# Patient Record
Sex: MALE | Employment: OTHER
[De-identification: names, ages, dates, MRNs, and addresses within clinical notes are randomized per-mention and may not be internally consistent; named-entity substitution may affect disease eponyms.]

---

## 2017-01-19 ENCOUNTER — CHARTING TRANS (OUTPATIENT)
Dept: OTHER | Age: 67
End: 2017-01-19

## 2017-01-25 ENCOUNTER — LAB SERVICES (OUTPATIENT)
Dept: OTHER | Age: 67
End: 2017-01-25

## 2017-01-25 LAB
ALBUMIN SERPL BCG-MCNC: 4.2 G/DL (ref 3.6–5.1)
ALBUMIN/CREAT UR: 11 MG/G (ref 0–30)
ALP SERPL-CCNC: 82 U/L (ref 30–130)
ALT SERPL W/O P-5'-P-CCNC: 94 U/L (ref 17–47)
AST SERPL-CCNC: 36 U/L (ref 14–43)
BILIRUB SERPL-MCNC: 1 MG/DL (ref 0–1.3)
BUN SERPL-MCNC: 18 MG/DL (ref 6–27)
CALCIUM SERPL-MCNC: 9.3 MG/DL (ref 8.6–10.6)
CHLORIDE SERPL-SCNC: 103 MMOL/L (ref 96–107)
CHOLEST SERPL-MCNC: 193 MG/DL (ref 140–200)
CREAT UR-MCNC: 222 MG/DL
CREATININE, SERUM: 1 MG/DL (ref 0.6–1.6)
DIFFERENTIAL TYPE: NORMAL
GFR SERPL CREATININE-BSD FRML MDRD: >60 ML/MIN/{1.73M2}
GFR SERPL CREATININE-BSD FRML MDRD: >60 ML/MIN/{1.73M2}
GLUCOSE P FAST SERPL-MCNC: 92 MG/DL (ref 60–100)
HCO3 SERPL-SCNC: 26 MMOL/L (ref 22–32)
HDLC SERPL-MCNC: 34 MG/DL
HEMATOCRIT: 46.1 % (ref 40–51)
HEMOGLOBIN: 15.8 G/DL (ref 13.7–17.5)
LDLC SERPL CALC-MCNC: 121 MG/DL (ref 30–100)
LYMPH PERCENT: 29.7 % (ref 20.5–51.1)
LYMPHOCYTE ABSOLUTE #: 1.2 10*3/UL (ref 1.2–3.4)
MEAN CORPUSCULAR HGB CONCENTRATION: 34.3 % (ref 32–36)
MEAN CORPUSCULAR HGB: 30.5 PG (ref 27–34)
MEAN CORPUSCULAR VOLUME: 89 FL (ref 79–95)
MEAN PLATELET VOLUME: 9.6 FL (ref 8.6–12.4)
MICROALBUMIN UR-MCNC: 24.4 MG/L
MIXED %: 9.4 % (ref 4.3–12.9)
MIXED ABSOLUTE #: 0.4 10*3/UL (ref 0.2–0.9)
NEUTROPHIL ABSOLUTE #: 2.4 10*3/UL (ref 1.4–6.5)
NEUTROPHIL PERCENT: 60.9 % (ref 34–73.5)
PLATELET COUNT: 221 10*3/UL (ref 150–400)
POTASSIUM SERPL-SCNC: 4.6 MMOL/L (ref 3.5–5.3)
PROT SERPL-MCNC: 7.2 G/DL (ref 6.4–8.5)
PSA SERPL-MCNC: 1.39 NG/ML (ref 0–4.4)
RED BLOOD CELL COUNT: 5.18 10*6/UL (ref 3.9–5.7)
RED CELL DISTRIBUTION WIDTH: 13 % (ref 11.3–14.8)
SODIUM SERPL-SCNC: 142 MMOL/L (ref 136–146)
TRIGL SERPL-MCNC: 190 MG/DL (ref 0–200)
WHITE BLOOD CELL COUNT: 4 10*3/UL (ref 4–10)

## 2017-01-28 ENCOUNTER — CHARTING TRANS (OUTPATIENT)
Dept: FAMILY MEDICINE | Age: 67
End: 2017-01-28

## 2017-02-10 ENCOUNTER — CHARTING TRANS (OUTPATIENT)
Dept: OTHER | Age: 67
End: 2017-02-10

## 2017-02-10 ENCOUNTER — LAB SERVICES (OUTPATIENT)
Dept: OTHER | Age: 67
End: 2017-02-10

## 2017-02-10 ENCOUNTER — CHARTING TRANS (OUTPATIENT)
Dept: URGENT CARE | Age: 67
End: 2017-02-10

## 2017-02-10 ENCOUNTER — MYAURORA ACCOUNT LINK (OUTPATIENT)
Dept: OTHER | Age: 67
End: 2017-02-10

## 2017-02-10 ASSESSMENT — PAIN SCALES - GENERAL: PAINLEVEL_OUTOF10: 7

## 2017-02-13 LAB — CULTURE WOUND WITH GRAM: ABNORMAL

## 2017-02-15 ENCOUNTER — CHARTING TRANS (OUTPATIENT)
Dept: OTHER | Age: 67
End: 2017-02-15

## 2017-02-16 ENCOUNTER — CHARTING TRANS (OUTPATIENT)
Dept: OTHER | Age: 67
End: 2017-02-16

## 2017-02-16 LAB — CULTURE ANAEROBIC: NORMAL

## 2017-02-17 ENCOUNTER — CHARTING TRANS (OUTPATIENT)
Dept: OTHER | Age: 67
End: 2017-02-17

## 2017-02-20 ENCOUNTER — CHARTING TRANS (OUTPATIENT)
Dept: FAMILY MEDICINE | Age: 67
End: 2017-02-20

## 2017-02-22 ENCOUNTER — CHARTING TRANS (OUTPATIENT)
Dept: OTHER | Age: 67
End: 2017-02-22

## 2017-02-23 ENCOUNTER — CHARTING TRANS (OUTPATIENT)
Dept: OTHER | Age: 67
End: 2017-02-23

## 2017-02-24 ENCOUNTER — CHARTING TRANS (OUTPATIENT)
Dept: OTHER | Age: 67
End: 2017-02-24

## 2017-02-25 ENCOUNTER — LAB SERVICES (OUTPATIENT)
Dept: OTHER | Age: 67
End: 2017-02-25

## 2017-02-27 ENCOUNTER — CHARTING TRANS (OUTPATIENT)
Dept: OTHER | Age: 67
End: 2017-02-27

## 2017-02-27 LAB — C DIFF TOX A+B STL QL IA: POSITIVE

## 2017-03-01 ENCOUNTER — CHARTING TRANS (OUTPATIENT)
Dept: OTHER | Age: 67
End: 2017-03-01

## 2017-03-07 ENCOUNTER — MYAURORA ACCOUNT LINK (OUTPATIENT)
Dept: OTHER | Age: 67
End: 2017-03-07

## 2017-03-07 ENCOUNTER — CHARTING TRANS (OUTPATIENT)
Dept: INFECTIOUS DISEASES | Age: 67
End: 2017-03-07

## 2017-03-09 ENCOUNTER — CHARTING TRANS (OUTPATIENT)
Dept: OTHER | Age: 67
End: 2017-03-09

## 2017-03-12 LAB — CULTURE FUNGAL WITH STAIN: NORMAL

## 2017-03-14 ENCOUNTER — CHARTING TRANS (OUTPATIENT)
Dept: OTHER | Age: 67
End: 2017-03-14

## 2017-03-15 ENCOUNTER — CHARTING TRANS (OUTPATIENT)
Dept: OTHER | Age: 67
End: 2017-03-15

## 2017-03-21 ENCOUNTER — CHARTING TRANS (OUTPATIENT)
Dept: OTHER | Age: 67
End: 2017-03-21

## 2017-03-23 ENCOUNTER — CHARTING TRANS (OUTPATIENT)
Dept: SURGERY | Age: 67
End: 2017-03-23

## 2017-03-24 ENCOUNTER — CHARTING TRANS (OUTPATIENT)
Dept: OTHER | Age: 67
End: 2017-03-24

## 2017-03-30 ENCOUNTER — CHARTING TRANS (OUTPATIENT)
Dept: OTHER | Age: 67
End: 2017-03-30

## 2017-04-03 ENCOUNTER — CHARTING TRANS (OUTPATIENT)
Dept: FAMILY MEDICINE | Age: 67
End: 2017-04-03

## 2017-04-08 LAB — CULTURE AFB WITH STAIN: NORMAL

## 2017-04-14 ENCOUNTER — CHARTING TRANS (OUTPATIENT)
Dept: OTHER | Age: 67
End: 2017-04-14

## 2017-04-24 ENCOUNTER — CHARTING TRANS (OUTPATIENT)
Dept: SURGERY | Age: 67
End: 2017-04-24

## 2017-04-24 ENCOUNTER — MYAURORA ACCOUNT LINK (OUTPATIENT)
Dept: OTHER | Age: 67
End: 2017-04-24

## 2017-08-24 ENCOUNTER — CHARTING TRANS (OUTPATIENT)
Dept: OTHER | Age: 67
End: 2017-08-24

## 2017-09-16 ENCOUNTER — CHARTING TRANS (OUTPATIENT)
Dept: OTHER | Age: 67
End: 2017-09-16

## 2017-09-18 ENCOUNTER — CHARTING TRANS (OUTPATIENT)
Dept: OTHER | Age: 67
End: 2017-09-18

## 2017-09-19 ENCOUNTER — CHARTING TRANS (OUTPATIENT)
Dept: OTHER | Age: 67
End: 2017-09-19

## 2017-09-22 ENCOUNTER — CHARTING TRANS (OUTPATIENT)
Dept: FAMILY MEDICINE | Age: 67
End: 2017-09-22

## 2017-09-27 ENCOUNTER — CHARTING TRANS (OUTPATIENT)
Dept: OTHER | Age: 67
End: 2017-09-27

## 2017-09-27 ENCOUNTER — MYAURORA ACCOUNT LINK (OUTPATIENT)
Dept: OTHER | Age: 67
End: 2017-09-27

## 2017-09-27 ENCOUNTER — CHARTING TRANS (OUTPATIENT)
Dept: OTOLARYNGOLOGY | Age: 67
End: 2017-09-27

## 2017-10-11 ENCOUNTER — MYAURORA ACCOUNT LINK (OUTPATIENT)
Dept: OTHER | Age: 67
End: 2017-10-11

## 2017-10-11 ENCOUNTER — CHARTING TRANS (OUTPATIENT)
Dept: OTHER | Age: 67
End: 2017-10-11

## 2017-10-11 ENCOUNTER — CHARTING TRANS (OUTPATIENT)
Dept: INTERNAL MEDICINE | Age: 67
End: 2017-10-11

## 2018-01-22 ENCOUNTER — LAB SERVICES (OUTPATIENT)
Dept: OTHER | Age: 68
End: 2018-01-22

## 2018-01-22 ENCOUNTER — MYAURORA ACCOUNT LINK (OUTPATIENT)
Dept: OTHER | Age: 68
End: 2018-01-22

## 2018-01-22 LAB
ALBUMIN SERPL BCG-MCNC: 4.1 G/DL (ref 3.6–5.1)
ALP SERPL-CCNC: 85 U/L (ref 30–130)
ALT SERPL W/O P-5'-P-CCNC: 95 U/L (ref 17–47)
AST SERPL-CCNC: 31 U/L (ref 14–43)
BILIRUB SERPL-MCNC: 0.7 MG/DL (ref 0–1.3)
BUN SERPL-MCNC: 12 MG/DL (ref 6–27)
CALCIUM SERPL-MCNC: 9.3 MG/DL (ref 8.6–10.6)
CHLORIDE SERPL-SCNC: 108 MMOL/L (ref 96–107)
CHOLEST SERPL-MCNC: 126 MG/DL (ref 140–200)
CREATININE, SERUM: 0.9 MG/DL (ref 0.6–1.6)
DIFFERENTIAL TYPE: ABNORMAL
GFR SERPL CREATININE-BSD FRML MDRD: >60 ML/MIN/{1.73M2}
GFR SERPL CREATININE-BSD FRML MDRD: >60 ML/MIN/{1.73M2}
GLUCOSE P FAST SERPL-MCNC: 90 MG/DL (ref 60–100)
HCO3 SERPL-SCNC: 24 MMOL/L (ref 22–32)
HDLC SERPL-MCNC: 39 MG/DL
HEMATOCRIT: 45 % (ref 40–51)
HEMOGLOBIN: 15.7 G/DL (ref 13.7–17.5)
LDLC SERPL CALC-MCNC: 59 MG/DL (ref 30–100)
LYMPH PERCENT: 32.3 % (ref 20.5–51.1)
LYMPHOCYTE ABSOLUTE #: 1.2 10*3/UL (ref 1.2–3.4)
MEAN CORPUSCULAR HGB CONCENTRATION: 34.9 % (ref 32–36)
MEAN CORPUSCULAR HGB: 31.4 PG (ref 27–34)
MEAN CORPUSCULAR VOLUME: 90 FL (ref 79–95)
MEAN PLATELET VOLUME: 9.5 FL (ref 8.6–12.4)
MIXED %: 7.4 % (ref 4.3–12.9)
MIXED ABSOLUTE #: 0.3 10*3/UL (ref 0.2–0.9)
NEUTROPHIL ABSOLUTE #: 2.1 10*3/UL (ref 1.4–6.5)
NEUTROPHIL PERCENT: 60.3 % (ref 34–73.5)
PLATELET COUNT: 233 10*3/UL (ref 150–400)
POTASSIUM SERPL-SCNC: 4.7 MMOL/L (ref 3.5–5.3)
PROT SERPL-MCNC: 7.1 G/DL (ref 6.4–8.5)
PSA SERPL-MCNC: 1.19 NG/ML (ref 0–4.6)
RED BLOOD CELL COUNT: 5 10*6/UL (ref 3.9–5.7)
RED CELL DISTRIBUTION WIDTH: 13.5 % (ref 11.3–14.8)
SODIUM SERPL-SCNC: 143 MMOL/L (ref 136–146)
TRIGL SERPL-MCNC: 138 MG/DL (ref 0–200)
WHITE BLOOD CELL COUNT: 3.6 10*3/UL (ref 4–10)

## 2018-03-01 ENCOUNTER — CHARTING TRANS (OUTPATIENT)
Dept: OTHER | Age: 68
End: 2018-03-01

## 2018-07-23 ENCOUNTER — CHARTING TRANS (OUTPATIENT)
Dept: OTHER | Age: 68
End: 2018-07-23

## 2018-08-23 ENCOUNTER — CHARTING TRANS (OUTPATIENT)
Dept: OTHER | Age: 68
End: 2018-08-23

## 2018-09-13 ENCOUNTER — CHARTING TRANS (OUTPATIENT)
Dept: OTHER | Age: 68
End: 2018-09-13

## 2018-11-08 ENCOUNTER — CHARTING TRANS (OUTPATIENT)
Dept: OTHER | Age: 68
End: 2018-11-08

## 2018-11-23 ENCOUNTER — IMAGING SERVICES (OUTPATIENT)
Dept: OTHER | Age: 68
End: 2018-11-23

## 2018-11-28 VITALS
BODY MASS INDEX: 27.35 KG/M2 | SYSTOLIC BLOOD PRESSURE: 130 MMHG | DIASTOLIC BLOOD PRESSURE: 80 MMHG | RESPIRATION RATE: 16 BRPM | TEMPERATURE: 97.3 F | HEIGHT: 70 IN | WEIGHT: 191 LBS | HEART RATE: 77 BPM

## 2018-11-28 VITALS
SYSTOLIC BLOOD PRESSURE: 140 MMHG | RESPIRATION RATE: 18 BRPM | TEMPERATURE: 97.5 F | HEART RATE: 70 BPM | WEIGHT: 192 LBS | DIASTOLIC BLOOD PRESSURE: 80 MMHG | HEIGHT: 70 IN | BODY MASS INDEX: 27.49 KG/M2

## 2018-11-28 VITALS — TEMPERATURE: 97 F | HEIGHT: 70 IN | BODY MASS INDEX: 26.77 KG/M2 | WEIGHT: 187 LBS

## 2018-11-28 VITALS — WEIGHT: 185 LBS | BODY MASS INDEX: 26.48 KG/M2 | HEIGHT: 70 IN | TEMPERATURE: 97.4 F

## 2018-11-28 VITALS
HEIGHT: 69 IN | WEIGHT: 189 LBS | BODY MASS INDEX: 27.99 KG/M2 | TEMPERATURE: 97.6 F | RESPIRATION RATE: 18 BRPM | SYSTOLIC BLOOD PRESSURE: 112 MMHG | DIASTOLIC BLOOD PRESSURE: 70 MMHG | HEART RATE: 88 BPM

## 2018-11-28 VITALS — BODY MASS INDEX: 27.49 KG/M2 | WEIGHT: 192 LBS | HEIGHT: 70 IN

## 2018-11-29 VITALS
WEIGHT: 184 LBS | SYSTOLIC BLOOD PRESSURE: 102 MMHG | BODY MASS INDEX: 27.25 KG/M2 | TEMPERATURE: 99.1 F | DIASTOLIC BLOOD PRESSURE: 68 MMHG | HEIGHT: 69 IN | RESPIRATION RATE: 16 BRPM | HEART RATE: 84 BPM

## 2018-11-29 VITALS
BODY MASS INDEX: 27.4 KG/M2 | SYSTOLIC BLOOD PRESSURE: 122 MMHG | HEART RATE: 87 BPM | WEIGHT: 185 LBS | HEIGHT: 69 IN | DIASTOLIC BLOOD PRESSURE: 78 MMHG | TEMPERATURE: 97.7 F

## 2018-11-29 VITALS
HEART RATE: 80 BPM | HEIGHT: 70 IN | RESPIRATION RATE: 16 BRPM | DIASTOLIC BLOOD PRESSURE: 88 MMHG | SYSTOLIC BLOOD PRESSURE: 138 MMHG | BODY MASS INDEX: 28.35 KG/M2 | TEMPERATURE: 98 F | WEIGHT: 198 LBS

## 2018-11-29 VITALS
TEMPERATURE: 97.7 F | SYSTOLIC BLOOD PRESSURE: 122 MMHG | DIASTOLIC BLOOD PRESSURE: 84 MMHG | OXYGEN SATURATION: 97 % | HEART RATE: 103 BPM | RESPIRATION RATE: 16 BRPM

## 2018-12-17 DIAGNOSIS — E78.5 HYPERLIPIDEMIA, UNSPECIFIED HYPERLIPIDEMIA TYPE: Primary | ICD-10-CM

## 2018-12-17 DIAGNOSIS — I10 ESSENTIAL HYPERTENSION, BENIGN: ICD-10-CM

## 2018-12-17 DIAGNOSIS — I10 BENIGN ESSENTIAL HYPERTENSION: ICD-10-CM

## 2018-12-17 DIAGNOSIS — K76.0 FATTY LIVER: ICD-10-CM

## 2018-12-17 DIAGNOSIS — Z12.5 SCREENING FOR PROSTATE CANCER: ICD-10-CM

## 2019-01-07 ENCOUNTER — OFFICE VISIT (OUTPATIENT)
Dept: FAMILY MEDICINE | Age: 69
End: 2019-01-07

## 2019-01-07 VITALS
BODY MASS INDEX: 29.74 KG/M2 | HEIGHT: 69 IN | RESPIRATION RATE: 16 BRPM | HEART RATE: 62 BPM | TEMPERATURE: 97.4 F | SYSTOLIC BLOOD PRESSURE: 120 MMHG | DIASTOLIC BLOOD PRESSURE: 76 MMHG | WEIGHT: 200.8 LBS

## 2019-01-07 DIAGNOSIS — E78.00 PURE HYPERCHOLESTEROLEMIA: ICD-10-CM

## 2019-01-07 DIAGNOSIS — Z23 NEED FOR VACCINATION AGAINST STREPTOCOCCUS PNEUMONIAE: ICD-10-CM

## 2019-01-07 DIAGNOSIS — Z11.59 NEED FOR HEPATITIS C SCREENING TEST: Primary | ICD-10-CM

## 2019-01-07 DIAGNOSIS — I10 ESSENTIAL HYPERTENSION, BENIGN: ICD-10-CM

## 2019-01-07 DIAGNOSIS — I35.1 MILD AORTIC REGURGITATION: ICD-10-CM

## 2019-01-07 DIAGNOSIS — K76.0 FATTY LIVER: ICD-10-CM

## 2019-01-07 PROCEDURE — 90670 PCV13 VACCINE IM: CPT

## 2019-01-07 PROCEDURE — 90471 IMMUNIZATION ADMIN: CPT

## 2019-01-07 PROCEDURE — 3078F DIAST BP <80 MM HG: CPT | Performed by: FAMILY MEDICINE

## 2019-01-07 PROCEDURE — 3074F SYST BP LT 130 MM HG: CPT | Performed by: FAMILY MEDICINE

## 2019-01-07 PROCEDURE — 99214 OFFICE O/P EST MOD 30 MIN: CPT | Performed by: FAMILY MEDICINE

## 2019-01-07 RX ORDER — ATORVASTATIN CALCIUM 10 MG/1
1 TABLET, FILM COATED ORAL NIGHTLY
COMMUNITY
Start: 2018-08-22 | End: 2019-07-02 | Stop reason: SDUPTHER

## 2019-01-07 RX ORDER — LISINOPRIL 10 MG/1
1 TABLET ORAL DAILY
COMMUNITY
Start: 2018-11-08 | End: 2019-03-30 | Stop reason: SDUPTHER

## 2019-01-07 SDOH — HEALTH STABILITY: MENTAL HEALTH: HOW OFTEN DO YOU HAVE A DRINK CONTAINING ALCOHOL?: NEVER

## 2019-01-07 ASSESSMENT — ENCOUNTER SYMPTOMS
NEUROLOGICAL NEGATIVE: 1
ACTIVITY CHANGE: 0
ALLERGIC/IMMUNOLOGIC NEGATIVE: 1
CHILLS: 0
CONSTITUTIONAL NEGATIVE: 1
PSYCHIATRIC NEGATIVE: 1
FATIGUE: 0
HEMATOLOGIC/LYMPHATIC NEGATIVE: 1
DIAPHORESIS: 0
ENDOCRINE NEGATIVE: 1
GASTROINTESTINAL NEGATIVE: 1
RESPIRATORY NEGATIVE: 1
EYES NEGATIVE: 1
FEVER: 0

## 2019-01-16 ENCOUNTER — LAB SERVICES (OUTPATIENT)
Dept: LAB | Age: 69
End: 2019-01-16

## 2019-01-16 ENCOUNTER — E-ADVICE (OUTPATIENT)
Dept: FAMILY MEDICINE | Age: 69
End: 2019-01-16

## 2019-01-16 DIAGNOSIS — E78.5 HYPERLIPIDEMIA, UNSPECIFIED HYPERLIPIDEMIA TYPE: ICD-10-CM

## 2019-01-16 DIAGNOSIS — Z12.5 SCREENING FOR PROSTATE CANCER: ICD-10-CM

## 2019-01-16 DIAGNOSIS — I10 BENIGN ESSENTIAL HYPERTENSION: ICD-10-CM

## 2019-01-16 DIAGNOSIS — K76.0 FATTY LIVER: ICD-10-CM

## 2019-01-16 DIAGNOSIS — I10 ESSENTIAL HYPERTENSION, BENIGN: ICD-10-CM

## 2019-01-16 DIAGNOSIS — Z11.59 NEED FOR HEPATITIS C SCREENING TEST: ICD-10-CM

## 2019-01-16 LAB
ALBUMIN SERPL-MCNC: 3.8 G/DL (ref 3.6–5.1)
ALP SERPL-CCNC: 96 U/L (ref 45–115)
ALT SERPL W/O P-5'-P-CCNC: 70 U/L (ref 5–49)
AST SERPL-CCNC: 26 U/L (ref 14–43)
BILIRUB SERPL-MCNC: 0.6 MG/DL (ref 0–1.3)
BUN SERPL-MCNC: 15 MG/DL (ref 6–27)
CALCIUM SERPL-MCNC: 8.9 MG/DL (ref 8.6–10.6)
CHLORIDE SERPL-SCNC: 106 MMOL/L (ref 96–107)
CHOLEST SERPL-MCNC: 111 MG/DL (ref 140–200)
CO2 SERPL-SCNC: 24 MMOL/L (ref 22–32)
CREAT SERPL-MCNC: 0.9 MG/DL (ref 0.6–1.6)
DIFFERENTIAL TYPE: NORMAL
GFR SERPL CREATININE-BSD FRML MDRD: >60 ML/MIN/{1.73M2}
GFR SERPL CREATININE-BSD FRML MDRD: >60 ML/MIN/{1.73M2}
GLUCOSE P FAST SERPL-MCNC: 99 MG/DL (ref 60–100)
HDLC SERPL-MCNC: 34 MG/DL
HEMATOCRIT: 45.6 % (ref 40–51)
HEMOGLOBIN: 15.6 G/DL (ref 13.7–17.5)
LDLC SERPL CALC-MCNC: 63 MG/DL (ref 30–100)
LYMPH PERCENT: 26 % (ref 20.5–51.1)
LYMPHOCYTE ABSOLUTE #: 1.4 10*3/UL (ref 1.2–3.4)
MEAN CORPUSCULAR HGB CONCENTRATION: 34.2 % (ref 32–36)
MEAN CORPUSCULAR HGB: 31 PG (ref 27–34)
MEAN CORPUSCULAR VOLUME: 90.7 FL (ref 79–95)
MEAN PLATELET VOLUME: 10 FL (ref 8.6–12.4)
MIXED %: 9.8 % (ref 4.3–12.9)
MIXED ABSOLUTE #: 0.5 10*3/UL (ref 0.2–0.9)
NEUTROPHIL ABSOLUTE #: 3.6 10*3/UL (ref 1.4–6.5)
NEUTROPHIL PERCENT: 64.2 % (ref 34–73.5)
PLATELET COUNT: 262 10*3/UL (ref 150–400)
POTASSIUM SERPL-SCNC: 4.4 MMOL/L (ref 3.5–5.3)
PROT SERPL-MCNC: 7.1 G/DL (ref 6.4–8.5)
PSA SERPL-MCNC: 1.2 NG/ML (ref 0–4.6)
RED BLOOD CELL COUNT: 5.03 10*6/UL (ref 3.9–5.7)
RED CELL DISTRIBUTION WIDTH: 13.4 % (ref 11.3–14.8)
SODIUM SERPL-SCNC: 139 MMOL/L (ref 136–146)
TRIGL SERPL-MCNC: 72 MG/DL (ref 0–200)
WHITE BLOOD CELL COUNT: 5.5 10*3/UL (ref 4–10)

## 2019-01-16 PROCEDURE — 85025 COMPLETE CBC W/AUTO DIFF WBC: CPT | Performed by: FAMILY MEDICINE

## 2019-01-16 PROCEDURE — 86803 HEPATITIS C AB TEST: CPT | Performed by: FAMILY MEDICINE

## 2019-01-16 PROCEDURE — 36415 COLL VENOUS BLD VENIPUNCTURE: CPT | Performed by: FAMILY MEDICINE

## 2019-01-16 PROCEDURE — 80053 COMPREHEN METABOLIC PANEL: CPT | Performed by: FAMILY MEDICINE

## 2019-01-16 PROCEDURE — G0103 PSA SCREENING: HCPCS | Performed by: FAMILY MEDICINE

## 2019-01-16 PROCEDURE — 80061 LIPID PANEL: CPT | Performed by: FAMILY MEDICINE

## 2019-01-17 LAB — HCV AB SER QL: NEGATIVE

## 2019-01-21 ENCOUNTER — TELEPHONE (OUTPATIENT)
Dept: FAMILY MEDICINE | Age: 69
End: 2019-01-21

## 2019-01-21 ENCOUNTER — OFFICE VISIT (OUTPATIENT)
Dept: FAMILY MEDICINE | Age: 69
End: 2019-01-21

## 2019-01-21 VITALS
BODY MASS INDEX: 29.83 KG/M2 | WEIGHT: 202 LBS | SYSTOLIC BLOOD PRESSURE: 118 MMHG | TEMPERATURE: 96.7 F | DIASTOLIC BLOOD PRESSURE: 78 MMHG | OXYGEN SATURATION: 97 % | HEART RATE: 90 BPM | RESPIRATION RATE: 20 BRPM

## 2019-01-21 DIAGNOSIS — J40 BRONCHITIS: Primary | ICD-10-CM

## 2019-01-21 PROCEDURE — 3078F DIAST BP <80 MM HG: CPT | Performed by: FAMILY MEDICINE

## 2019-01-21 PROCEDURE — 3074F SYST BP LT 130 MM HG: CPT | Performed by: FAMILY MEDICINE

## 2019-01-21 PROCEDURE — 99214 OFFICE O/P EST MOD 30 MIN: CPT | Performed by: FAMILY MEDICINE

## 2019-01-21 RX ORDER — AZITHROMYCIN 250 MG/1
TABLET, FILM COATED ORAL
Qty: 6 TABLET | Refills: 0 | Status: SHIPPED | OUTPATIENT
Start: 2019-01-21 | End: 2019-06-13 | Stop reason: ALTCHOICE

## 2019-01-21 ASSESSMENT — ENCOUNTER SYMPTOMS
CHILLS: 0
SHORTNESS OF BREATH: 0
ABDOMINAL PAIN: 0
DIZZINESS: 0
FEVER: 0
COUGH: 1
SORE THROAT: 1

## 2019-01-24 ENCOUNTER — IMAGING SERVICES (OUTPATIENT)
Dept: OTHER | Age: 69
End: 2019-01-24

## 2019-04-09 RX ORDER — LISINOPRIL 10 MG/1
TABLET ORAL
Qty: 30 TABLET | Refills: 0 | Status: SHIPPED | OUTPATIENT
Start: 2019-04-09 | End: 2019-05-01 | Stop reason: SDUPTHER

## 2019-05-02 RX ORDER — LISINOPRIL 10 MG/1
TABLET ORAL
Qty: 30 TABLET | Refills: 0 | Status: SHIPPED | OUTPATIENT
Start: 2019-05-02 | End: 2019-05-07 | Stop reason: SDUPTHER

## 2019-05-07 RX ORDER — LISINOPRIL 10 MG/1
10 TABLET ORAL DAILY
Qty: 30 TABLET | Refills: 5 | Status: SHIPPED | OUTPATIENT
Start: 2019-05-07 | End: 2019-12-19 | Stop reason: SDUPTHER

## 2019-06-13 ENCOUNTER — OFFICE VISIT (OUTPATIENT)
Dept: INTERNAL MEDICINE | Age: 69
End: 2019-06-13

## 2019-06-13 DIAGNOSIS — L02.31 ABSCESS, GLUTEAL, RIGHT: Primary | ICD-10-CM

## 2019-06-13 PROCEDURE — 99214 OFFICE O/P EST MOD 30 MIN: CPT | Performed by: PHYSICIAN ASSISTANT

## 2019-06-13 PROCEDURE — 3078F DIAST BP <80 MM HG: CPT | Performed by: PHYSICIAN ASSISTANT

## 2019-06-13 PROCEDURE — 3074F SYST BP LT 130 MM HG: CPT | Performed by: PHYSICIAN ASSISTANT

## 2019-06-13 RX ORDER — SULFAMETHOXAZOLE AND TRIMETHOPRIM 800; 160 MG/1; MG/1
1 TABLET ORAL 2 TIMES DAILY
Qty: 14 TABLET | Refills: 0 | Status: SHIPPED | OUTPATIENT
Start: 2019-06-13 | End: 2019-12-30 | Stop reason: ALTCHOICE

## 2019-06-13 SDOH — HEALTH STABILITY: PHYSICAL HEALTH: ON AVERAGE, HOW MANY MINUTES DO YOU ENGAGE IN EXERCISE AT THIS LEVEL?: 0 MIN

## 2019-06-13 SDOH — HEALTH STABILITY: MENTAL HEALTH
STRESS IS WHEN SOMEONE FEELS TENSE, NERVOUS, ANXIOUS, OR CAN'T SLEEP AT NIGHT BECAUSE THEIR MIND IS TROUBLED. HOW STRESSED ARE YOU?: NOT AT ALL

## 2019-06-13 SDOH — HEALTH STABILITY: PHYSICAL HEALTH: ON AVERAGE, HOW MANY DAYS PER WEEK DO YOU ENGAGE IN MODERATE TO STRENUOUS EXERCISE (LIKE A BRISK WALK)?: 0 DAYS

## 2019-06-13 SDOH — HEALTH STABILITY: MENTAL HEALTH: HOW OFTEN DO YOU HAVE A DRINK CONTAINING ALCOHOL?: NEVER

## 2019-06-13 ASSESSMENT — PATIENT HEALTH QUESTIONNAIRE - PHQ9
SUM OF ALL RESPONSES TO PHQ9 QUESTIONS 1 AND 2: 0
1. LITTLE INTEREST OR PLEASURE IN DOING THINGS: NOT AT ALL
SUM OF ALL RESPONSES TO PHQ9 QUESTIONS 1 AND 2: 0
2. FEELING DOWN, DEPRESSED OR HOPELESS: NOT AT ALL

## 2019-06-13 ASSESSMENT — PAIN SCALES - GENERAL: PAINLEVEL: 3-4

## 2019-07-02 RX ORDER — ATORVASTATIN CALCIUM 10 MG/1
10 TABLET, FILM COATED ORAL NIGHTLY
Qty: 90 TABLET | Refills: 0 | Status: SHIPPED | OUTPATIENT
Start: 2019-07-02 | End: 2019-12-20 | Stop reason: SDUPTHER

## 2019-12-19 RX ORDER — LISINOPRIL 10 MG/1
10 TABLET ORAL DAILY
Qty: 30 TABLET | Refills: 0 | Status: SHIPPED | OUTPATIENT
Start: 2019-12-19 | End: 2019-12-30 | Stop reason: DRUGHIGH

## 2019-12-23 RX ORDER — ATORVASTATIN CALCIUM 10 MG/1
10 TABLET, FILM COATED ORAL NIGHTLY
Qty: 30 TABLET | Refills: 0 | OUTPATIENT
Start: 2019-12-23

## 2019-12-23 RX ORDER — LISINOPRIL 10 MG/1
TABLET ORAL
Qty: 30 TABLET | Refills: 4 | OUTPATIENT
Start: 2019-12-23

## 2019-12-23 RX ORDER — ATORVASTATIN CALCIUM 10 MG/1
10 TABLET, FILM COATED ORAL NIGHTLY
Qty: 90 TABLET | Refills: 0 | Status: SHIPPED | OUTPATIENT
Start: 2019-12-23 | End: 2019-12-30 | Stop reason: SDUPTHER

## 2019-12-30 ENCOUNTER — OFFICE VISIT (OUTPATIENT)
Dept: FAMILY MEDICINE | Age: 69
End: 2019-12-30

## 2019-12-30 VITALS
BODY MASS INDEX: 28.88 KG/M2 | HEART RATE: 80 BPM | WEIGHT: 195 LBS | SYSTOLIC BLOOD PRESSURE: 110 MMHG | DIASTOLIC BLOOD PRESSURE: 82 MMHG | HEIGHT: 69 IN

## 2019-12-30 DIAGNOSIS — H61.22 IMPACTED CERUMEN OF LEFT EAR: ICD-10-CM

## 2019-12-30 DIAGNOSIS — E78.5 HYPERLIPIDEMIA, UNSPECIFIED HYPERLIPIDEMIA TYPE: ICD-10-CM

## 2019-12-30 DIAGNOSIS — I10 ESSENTIAL HYPERTENSION: ICD-10-CM

## 2019-12-30 DIAGNOSIS — Z23 NEED FOR TDAP VACCINATION: ICD-10-CM

## 2019-12-30 DIAGNOSIS — Z79.899 ENCOUNTER FOR LONG-TERM (CURRENT) USE OF HIGH-RISK MEDICATION: ICD-10-CM

## 2019-12-30 DIAGNOSIS — Z00.00 MEDICARE ANNUAL WELLNESS VISIT, SUBSEQUENT: Primary | ICD-10-CM

## 2019-12-30 PROCEDURE — G0439 PPPS, SUBSEQ VISIT: HCPCS | Performed by: FAMILY MEDICINE

## 2019-12-30 PROCEDURE — 90471 IMMUNIZATION ADMIN: CPT

## 2019-12-30 PROCEDURE — 3074F SYST BP LT 130 MM HG: CPT | Performed by: FAMILY MEDICINE

## 2019-12-30 PROCEDURE — 90715 TDAP VACCINE 7 YRS/> IM: CPT

## 2019-12-30 PROCEDURE — 3079F DIAST BP 80-89 MM HG: CPT | Performed by: FAMILY MEDICINE

## 2019-12-30 PROCEDURE — 99214 OFFICE O/P EST MOD 30 MIN: CPT | Performed by: FAMILY MEDICINE

## 2019-12-30 RX ORDER — ATORVASTATIN CALCIUM 10 MG/1
10 TABLET, FILM COATED ORAL NIGHTLY
Qty: 90 TABLET | Refills: 2 | Status: SHIPPED | OUTPATIENT
Start: 2019-12-30 | End: 2020-05-04 | Stop reason: SDUPTHER

## 2019-12-30 RX ORDER — LISINOPRIL 10 MG/1
5 TABLET ORAL DAILY
Qty: 30 TABLET | Refills: 2 | Status: SHIPPED | OUTPATIENT
Start: 2019-12-30 | End: 2020-03-16 | Stop reason: SDUPTHER

## 2019-12-30 SDOH — HEALTH STABILITY: MENTAL HEALTH: HOW OFTEN DO YOU HAVE A DRINK CONTAINING ALCOHOL?: NEVER

## 2019-12-30 ASSESSMENT — ENCOUNTER SYMPTOMS
ABDOMINAL PAIN: 0
BLOOD IN STOOL: 0
EYE DISCHARGE: 0
CHILLS: 0
CONSTIPATION: 0
EYE REDNESS: 0
HEADACHES: 0
LIGHT-HEADEDNESS: 0
SHORTNESS OF BREATH: 0
ABDOMINAL DISTENTION: 0
NUMBNESS: 0
SLEEP DISTURBANCE: 0
POLYDIPSIA: 0
BACK PAIN: 0
WHEEZING: 0
ACTIVITY CHANGE: 0
UNEXPECTED WEIGHT CHANGE: 0
COUGH: 0
DIARRHEA: 0
CHEST TIGHTNESS: 0
NAUSEA: 0
EYE ITCHING: 0
WEAKNESS: 0
NERVOUS/ANXIOUS: 0
FATIGUE: 0
FEVER: 0
BRUISES/BLEEDS EASILY: 0
COLOR CHANGE: 0
SORE THROAT: 0
APPETITE CHANGE: 0
DIZZINESS: 0

## 2020-01-06 ENCOUNTER — LAB SERVICES (OUTPATIENT)
Dept: LAB | Age: 70
End: 2020-01-06

## 2020-01-06 DIAGNOSIS — Z79.899 ENCOUNTER FOR LONG-TERM (CURRENT) USE OF HIGH-RISK MEDICATION: ICD-10-CM

## 2020-01-06 LAB
ALBUMIN SERPL-MCNC: 4.3 G/DL (ref 3.6–5.1)
ALP SERPL-CCNC: 91 U/L (ref 45–115)
ALT SERPL W/O P-5'-P-CCNC: 54 U/L (ref 5–49)
AST SERPL-CCNC: 23 U/L (ref 14–43)
BASOPHIL %: 0.4 % (ref 0–1.2)
BASOPHIL ABSOLUTE #: 0 10*3/UL (ref 0–0.1)
BILIRUB SERPL-MCNC: 0.8 MG/DL (ref 0–1.3)
BUN SERPL-MCNC: 16 MG/DL (ref 6–27)
CALCIUM SERPL-MCNC: 9.4 MG/DL (ref 8.6–10.6)
CHLORIDE SERPL-SCNC: 107 MMOL/L (ref 96–107)
CHOLEST SERPL-MCNC: 130 MG/DL (ref 140–200)
CO2 SERPL-SCNC: 28 MMOL/L (ref 22–32)
CREAT SERPL-MCNC: 1 MG/DL (ref 0.6–1.6)
DIFFERENTIAL TYPE: NORMAL
EOSINOPHIL %: 5.6 % (ref 0–10)
EOSINOPHIL ABSOLUTE #: 0.3 10*3/UL (ref 0–0.5)
EST. AVERAGE GLUCOSE BLD GHB EST-MCNC: 125 MG/DL (ref 0–154)
GFR SERPL CREATININE-BSD FRML MDRD: >60 ML/MIN/{1.73M2}
GFR SERPL CREATININE-BSD FRML MDRD: >60 ML/MIN/{1.73M2}
GLUCOSE P FAST SERPL-MCNC: 100 MG/DL (ref 60–100)
HBA1C MFR BLD: 6 % (ref 4.2–6)
HDLC SERPL-MCNC: 41 MG/DL
HEMATOCRIT: 46 % (ref 40–51)
HEMOGLOBIN: 15.2 G/DL (ref 13.7–17.5)
LDLC SERPL CALC-MCNC: 68 MG/DL (ref 30–100)
LYMPH PERCENT: 30.4 % (ref 20.5–51.1)
LYMPHOCYTE ABSOLUTE #: 1.4 10*3/UL (ref 1.2–3.4)
MEAN CORPUSCULAR HGB CONCENTRATION: 33 % (ref 32–36)
MEAN CORPUSCULAR HGB: 30.2 PG (ref 27–34)
MEAN CORPUSCULAR VOLUME: 91.3 FL (ref 79–95)
MEAN PLATELET VOLUME: 10.4 FL (ref 8.6–12.4)
MONOCYTE ABSOLUTE #: 0.3 10*3/UL (ref 0.2–0.9)
MONOCYTE PERCENT: 6.4 % (ref 4.3–12.9)
NEUTROPHIL ABSOLUTE #: 2.6 10*3/UL (ref 1.4–6.5)
NEUTROPHIL PERCENT: 57.2 % (ref 34–73.5)
PLATELET COUNT: 266 10*3/UL (ref 150–400)
POTASSIUM SERPL-SCNC: 5.2 MMOL/L (ref 3.5–5.3)
PROT SERPL-MCNC: 7.6 G/DL (ref 6.4–8.5)
RED BLOOD CELL COUNT: 5.04 10*6/UL (ref 3.9–5.7)
RED CELL DISTRIBUTION WIDTH: 13.5 % (ref 11.3–14.8)
SODIUM SERPL-SCNC: 142 MMOL/L (ref 136–146)
TRIGL SERPL-MCNC: 105 MG/DL (ref 0–200)
TSH SERPL DL<=0.05 MIU/L-ACNC: 1.88 M[IU]/L (ref 0.3–4.82)
WHITE BLOOD CELL COUNT: 4.5 10*3/UL (ref 4–10)

## 2020-01-06 PROCEDURE — 80050 GENERAL HEALTH PANEL: CPT | Performed by: FAMILY MEDICINE

## 2020-01-06 PROCEDURE — 83036 HEMOGLOBIN GLYCOSYLATED A1C: CPT | Performed by: FAMILY MEDICINE

## 2020-01-06 PROCEDURE — 80061 LIPID PANEL: CPT | Performed by: FAMILY MEDICINE

## 2020-01-06 PROCEDURE — 36415 COLL VENOUS BLD VENIPUNCTURE: CPT | Performed by: FAMILY MEDICINE

## 2020-01-08 RX ORDER — LISINOPRIL 10 MG/1
5 TABLET ORAL DAILY
Qty: 30 TABLET | Refills: 2 | OUTPATIENT
Start: 2020-01-08

## 2020-03-17 RX ORDER — LISINOPRIL 10 MG/1
5 TABLET ORAL DAILY
Qty: 45 TABLET | Refills: 2 | Status: SHIPPED | OUTPATIENT
Start: 2020-03-17 | End: 2020-04-17 | Stop reason: SDUPTHER

## 2020-04-17 RX ORDER — LISINOPRIL 10 MG/1
5 TABLET ORAL DAILY
Qty: 45 TABLET | Refills: 0 | Status: SHIPPED | OUTPATIENT
Start: 2020-04-17 | End: 2020-07-07 | Stop reason: SDUPTHER

## 2020-05-04 RX ORDER — ATORVASTATIN CALCIUM 10 MG/1
10 TABLET, FILM COATED ORAL NIGHTLY
Qty: 90 TABLET | Refills: 2 | Status: SHIPPED | OUTPATIENT
Start: 2020-05-04 | End: 2020-10-29 | Stop reason: SDUPTHER

## 2020-05-22 ENCOUNTER — E-ADVICE (OUTPATIENT)
Dept: FAMILY MEDICINE | Age: 70
End: 2020-05-22

## 2020-06-18 ENCOUNTER — E-ADVICE (OUTPATIENT)
Dept: FAMILY MEDICINE | Age: 70
End: 2020-06-18

## 2020-06-19 ENCOUNTER — WALK IN (OUTPATIENT)
Dept: URGENT CARE | Age: 70
End: 2020-06-19

## 2020-06-19 ENCOUNTER — TELEPHONE (OUTPATIENT)
Dept: FAMILY MEDICINE | Age: 70
End: 2020-06-19

## 2020-06-19 DIAGNOSIS — M25.561 ACUTE PAIN OF RIGHT KNEE: Primary | ICD-10-CM

## 2020-06-19 PROCEDURE — A6449 LT COMPRES BAND >=3" <5"/YD: HCPCS | Performed by: FAMILY MEDICINE

## 2020-06-19 PROCEDURE — 99213 OFFICE O/P EST LOW 20 MIN: CPT | Performed by: FAMILY MEDICINE

## 2020-06-19 PROCEDURE — 3077F SYST BP >= 140 MM HG: CPT | Performed by: FAMILY MEDICINE

## 2020-06-19 PROCEDURE — 3078F DIAST BP <80 MM HG: CPT | Performed by: FAMILY MEDICINE

## 2020-06-19 RX ORDER — NAPROXEN 500 MG/1
500 TABLET ORAL 2 TIMES DAILY WITH MEALS
Qty: 30 TABLET | Refills: 0 | Status: SHIPPED | OUTPATIENT
Start: 2020-06-19 | End: 2020-07-04

## 2020-06-19 ASSESSMENT — PAIN SCALES - GENERAL: PAINLEVEL: 5-6

## 2020-07-09 RX ORDER — LISINOPRIL 10 MG/1
5 TABLET ORAL DAILY
Qty: 45 TABLET | Refills: 0 | Status: SHIPPED | OUTPATIENT
Start: 2020-07-09 | End: 2020-10-01

## 2020-07-15 RX ORDER — NAPROXEN 500 MG/1
500 TABLET ORAL 2 TIMES DAILY WITH MEALS
Qty: 30 TABLET | Refills: 0 | OUTPATIENT
Start: 2020-07-15 | End: 2020-07-30

## 2020-10-01 RX ORDER — LISINOPRIL 10 MG/1
TABLET ORAL
Qty: 45 TABLET | Refills: 0 | Status: SHIPPED | OUTPATIENT
Start: 2020-10-01 | End: 2020-12-29

## 2020-11-01 RX ORDER — ATORVASTATIN CALCIUM 10 MG/1
10 TABLET, FILM COATED ORAL NIGHTLY
Qty: 90 TABLET | Refills: 0 | Status: SHIPPED | OUTPATIENT
Start: 2020-11-01 | End: 2021-04-30

## 2020-11-21 ENCOUNTER — E-ADVICE (OUTPATIENT)
Dept: FAMILY MEDICINE | Age: 70
End: 2020-11-21

## 2020-12-29 RX ORDER — LISINOPRIL 10 MG/1
TABLET ORAL
Qty: 45 TABLET | Refills: 0 | Status: SHIPPED | OUTPATIENT
Start: 2020-12-29 | End: 2021-03-25 | Stop reason: SDUPTHER

## 2021-03-25 RX ORDER — LISINOPRIL 10 MG/1
TABLET ORAL
Qty: 45 TABLET | Refills: 0 | OUTPATIENT
Start: 2021-03-25

## 2021-03-25 RX ORDER — LISINOPRIL 10 MG/1
5 TABLET ORAL DAILY
Qty: 45 TABLET | Refills: 0 | Status: SHIPPED | OUTPATIENT
Start: 2021-03-25 | End: 2021-04-29 | Stop reason: DRUGHIGH

## 2021-04-06 ENCOUNTER — E-ADVICE (OUTPATIENT)
Dept: FAMILY MEDICINE | Age: 71
End: 2021-04-06

## 2021-04-29 ENCOUNTER — LAB SERVICES (OUTPATIENT)
Dept: LAB | Age: 71
End: 2021-04-29

## 2021-04-29 ENCOUNTER — IMAGING SERVICES (OUTPATIENT)
Dept: GENERAL RADIOLOGY | Age: 71
End: 2021-04-29
Attending: FAMILY MEDICINE

## 2021-04-29 ENCOUNTER — OFFICE VISIT (OUTPATIENT)
Dept: FAMILY MEDICINE | Age: 71
End: 2021-04-29

## 2021-04-29 VITALS
DIASTOLIC BLOOD PRESSURE: 80 MMHG | HEIGHT: 69 IN | SYSTOLIC BLOOD PRESSURE: 136 MMHG | BODY MASS INDEX: 29.62 KG/M2 | WEIGHT: 200 LBS

## 2021-04-29 DIAGNOSIS — Z23 NEED FOR PNEUMOCOCCAL VACCINATION: ICD-10-CM

## 2021-04-29 DIAGNOSIS — M25.561 CHRONIC PAIN OF RIGHT KNEE: ICD-10-CM

## 2021-04-29 DIAGNOSIS — I10 ESSENTIAL (PRIMARY) HYPERTENSION: ICD-10-CM

## 2021-04-29 DIAGNOSIS — R21 BUTTERFLY RASH: ICD-10-CM

## 2021-04-29 DIAGNOSIS — Z00.00 MEDICARE ANNUAL WELLNESS VISIT, SUBSEQUENT: Primary | ICD-10-CM

## 2021-04-29 DIAGNOSIS — Z79.899 ENCOUNTER FOR LONG-TERM (CURRENT) USE OF MEDICATIONS: ICD-10-CM

## 2021-04-29 DIAGNOSIS — G89.29 CHRONIC PAIN OF RIGHT KNEE: ICD-10-CM

## 2021-04-29 DIAGNOSIS — Z12.11 SCREENING FOR COLON CANCER: ICD-10-CM

## 2021-04-29 DIAGNOSIS — E78.2 HYPERLIPEMIA, MIXED: ICD-10-CM

## 2021-04-29 LAB
ALBUMIN SERPL-MCNC: 4.1 G/DL (ref 3.6–5.1)
ALP SERPL-CCNC: 96 U/L (ref 45–115)
ALT SERPL W/O P-5'-P-CCNC: 49 U/L (ref 5–49)
AST SERPL-CCNC: 26 U/L (ref 14–43)
BASOPHIL %: 0.8 % (ref 0–1.2)
BASOPHIL ABSOLUTE #: 0 10*3/UL (ref 0–0.1)
BILIRUB SERPL-MCNC: 1 MG/DL (ref 0–1.3)
BILIRUBIN URINE: NEGATIVE
BLOOD URINE: NEGATIVE
BUN SERPL-MCNC: 14 MG/DL (ref 6–27)
CALCIUM SERPL-MCNC: 9.4 MG/DL (ref 8.6–10.6)
CHLORIDE SERPL-SCNC: 104 MMOL/L (ref 96–107)
CHOLEST SERPL-MCNC: 142 MG/DL (ref 140–200)
CLARITY: CLEAR
CO2 SERPL-SCNC: 26 MMOL/L (ref 22–32)
COLOR: YELLOW
CREAT SERPL-MCNC: 0.8 MG/DL (ref 0.6–1.6)
CRP SERPL-MCNC: <0.5 MG/DL (ref 0–1)
DIFFERENTIAL TYPE: ABNORMAL
EOSINOPHIL %: 2.8 % (ref 0–10)
EOSINOPHIL ABSOLUTE #: 0.1 10*3/UL (ref 0–0.5)
EST. AVERAGE GLUCOSE BLD GHB EST-MCNC: 123 MG/DL (ref 0–154)
GFR SERPL CREATININE-BSD FRML MDRD: >60 ML/MIN/{1.73M2}
GFR SERPL CREATININE-BSD FRML MDRD: >60 ML/MIN/{1.73M2}
GLUCOSE QUALITATIVE U: NEGATIVE
GLUCOSE SERPL-MCNC: 93 MG/DL (ref 70–200)
HBA1C MFR BLD: 5.9 % (ref 4.2–6)
HDLC SERPL-MCNC: 39 MG/DL
HEMATOCRIT: 46.6 % (ref 40–51)
HEMOGLOBIN: 15.6 G/DL (ref 13.7–17.5)
IMMATURE GRANULOCYTE ABSOLUTE: 0.01 10*3/UL (ref 0–0.05)
IMMATURE GRANULOCYTE PERCENT: 0.2 % (ref 0–0.5)
KETONES, URINE: NEGATIVE
LDLC SERPL CALC-MCNC: 81 MG/DL (ref 30–100)
LEUKOCYTE ESTERASE URINE: NEGATIVE
LYMPH PERCENT: 22.9 % (ref 20.5–51.1)
LYMPHOCYTE ABSOLUTE #: 1.1 10*3/UL (ref 1.2–3.4)
MEAN CORPUSCULAR HGB CONCENTRATION: 33.5 % (ref 32–36)
MEAN CORPUSCULAR HGB: 30.3 PG (ref 27–34)
MEAN CORPUSCULAR VOLUME: 90.5 FL (ref 79–95)
MEAN PLATELET VOLUME: 10.6 FL (ref 8.6–12.4)
MONOCYTE ABSOLUTE #: 0.4 10*3/UL (ref 0.2–0.9)
MONOCYTE PERCENT: 7.9 % (ref 4.3–12.9)
NEUTROPHIL ABSOLUTE #: 3.1 10*3/UL (ref 1.4–6.5)
NEUTROPHIL PERCENT: 65.4 % (ref 34–73.5)
NITRITE URINE: NEGATIVE
PH URINE: 5 (ref 5–7)
PLATELET COUNT: 230 10*3/UL (ref 150–400)
POTASSIUM SERPL-SCNC: 4.5 MMOL/L (ref 3.5–5.3)
PROT SERPL-MCNC: 7.2 G/DL (ref 6.4–8.5)
RED BLOOD CELL COUNT: 5.15 10*6/UL (ref 3.9–5.7)
RED CELL DISTRIBUTION WIDTH: 13.2 % (ref 11.3–14.8)
SEDIMENTATION RATE, RBC: 15 MM/H (ref 0–10)
SODIUM SERPL-SCNC: 139 MMOL/L (ref 136–146)
SPECIFIC GRAVITY URINE: 1.02 (ref 1–1.03)
TRIGL SERPL-MCNC: 109 MG/DL (ref 0–200)
TSH SERPL DL<=0.05 MIU/L-ACNC: 1.82 M[IU]/L (ref 0.3–4.82)
URINE PROTEIN, QUAL (DIPSTICK): NEGATIVE
UROBILINOGEN URINE: 2
WHITE BLOOD CELL COUNT: 4.7 10*3/UL (ref 4–10)

## 2021-04-29 PROCEDURE — 83036 HEMOGLOBIN GLYCOSYLATED A1C: CPT | Performed by: FAMILY MEDICINE

## 2021-04-29 PROCEDURE — 36415 COLL VENOUS BLD VENIPUNCTURE: CPT | Performed by: FAMILY MEDICINE

## 2021-04-29 PROCEDURE — 3079F DIAST BP 80-89 MM HG: CPT | Performed by: FAMILY MEDICINE

## 2021-04-29 PROCEDURE — 86038 ANTINUCLEAR ANTIBODIES: CPT | Performed by: FAMILY MEDICINE

## 2021-04-29 PROCEDURE — 90471 IMMUNIZATION ADMIN: CPT

## 2021-04-29 PROCEDURE — 86200 CCP ANTIBODY: CPT | Performed by: FAMILY MEDICINE

## 2021-04-29 PROCEDURE — 81003 URINALYSIS AUTO W/O SCOPE: CPT | Performed by: FAMILY MEDICINE

## 2021-04-29 PROCEDURE — 73564 X-RAY EXAM KNEE 4 OR MORE: CPT | Performed by: RADIOLOGY

## 2021-04-29 PROCEDURE — 80050 GENERAL HEALTH PANEL: CPT | Performed by: FAMILY MEDICINE

## 2021-04-29 PROCEDURE — 85652 RBC SED RATE AUTOMATED: CPT | Performed by: FAMILY MEDICINE

## 2021-04-29 PROCEDURE — 99397 PER PM REEVAL EST PAT 65+ YR: CPT | Performed by: FAMILY MEDICINE

## 2021-04-29 PROCEDURE — 86225 DNA ANTIBODY NATIVE: CPT | Performed by: FAMILY MEDICINE

## 2021-04-29 PROCEDURE — 99214 OFFICE O/P EST MOD 30 MIN: CPT | Performed by: FAMILY MEDICINE

## 2021-04-29 PROCEDURE — 86140 C-REACTIVE PROTEIN: CPT | Performed by: FAMILY MEDICINE

## 2021-04-29 PROCEDURE — 90732 PPSV23 VACC 2 YRS+ SUBQ/IM: CPT

## 2021-04-29 PROCEDURE — 3075F SYST BP GE 130 - 139MM HG: CPT | Performed by: FAMILY MEDICINE

## 2021-04-29 PROCEDURE — 80061 LIPID PANEL: CPT | Performed by: FAMILY MEDICINE

## 2021-04-29 ASSESSMENT — ENCOUNTER SYMPTOMS
NUMBNESS: 0
HEADACHES: 0
LIGHT-HEADEDNESS: 0
POLYDIPSIA: 0
BLOOD IN STOOL: 0
FATIGUE: 0
PHOTOPHOBIA: 0
ABDOMINAL DISTENTION: 0
SHORTNESS OF BREATH: 0
UNEXPECTED WEIGHT CHANGE: 0
BRUISES/BLEEDS EASILY: 0
COLOR CHANGE: 0
WHEEZING: 0
FEVER: 0
CHILLS: 0
DIZZINESS: 0
DIARRHEA: 0
BACK PAIN: 0
NAUSEA: 0
CONSTIPATION: 0
SLEEP DISTURBANCE: 0
NERVOUS/ANXIOUS: 0
COUGH: 0
ABDOMINAL PAIN: 0
APPETITE CHANGE: 0
ACTIVITY CHANGE: 0
WEAKNESS: 0
SORE THROAT: 0
CHEST TIGHTNESS: 0

## 2021-04-29 ASSESSMENT — PATIENT HEALTH QUESTIONNAIRE - PHQ9
CLINICAL INTERPRETATION OF PHQ9 SCORE: NO FURTHER SCREENING NEEDED
SUM OF ALL RESPONSES TO PHQ9 QUESTIONS 1 AND 2: 0
CLINICAL INTERPRETATION OF PHQ2 SCORE: NO FURTHER SCREENING NEEDED
1. LITTLE INTEREST OR PLEASURE IN DOING THINGS: NOT AT ALL
2. FEELING DOWN, DEPRESSED OR HOPELESS: NOT AT ALL
SUM OF ALL RESPONSES TO PHQ9 QUESTIONS 1 AND 2: 0

## 2021-04-30 DIAGNOSIS — R89.9 ABNORMAL LABORATORY TEST RESULT: Primary | ICD-10-CM

## 2021-04-30 LAB
ANA SER QL IA: NORMAL RATIO (ref 0–0.6)
CCP IGG FLD IA-ACNC: <0.4 U/ML (ref 0–7)
DSDNA IGG SERPL IA-ACNC: NORMAL [IU]/ML (ref 0–10)

## 2021-04-30 RX ORDER — ATORVASTATIN CALCIUM 10 MG/1
TABLET, FILM COATED ORAL
Qty: 90 TABLET | Refills: 0 | Status: SHIPPED | OUTPATIENT
Start: 2021-04-30 | End: 2021-07-26

## 2021-04-30 ASSESSMENT — ENCOUNTER SYMPTOMS
BRUISES/BLEEDS EASILY: 0
WEAKNESS: 0
FEVER: 0
WHEEZING: 0
SHORTNESS OF BREATH: 0
DIARRHEA: 0
WOUND: 0
BACK PAIN: 0
CHILLS: 0
VOMITING: 0
ACTIVITY CHANGE: 0
NUMBNESS: 0
ADENOPATHY: 0
PHOTOPHOBIA: 0
NAUSEA: 0
CHEST TIGHTNESS: 0
COLOR CHANGE: 0

## 2021-05-03 ENCOUNTER — OFFICE VISIT (OUTPATIENT)
Dept: SPORTS MEDICINE | Age: 71
End: 2021-05-03
Attending: FAMILY MEDICINE

## 2021-05-03 ENCOUNTER — LAB SERVICES (OUTPATIENT)
Dept: LAB | Age: 71
End: 2021-05-03

## 2021-05-03 VITALS
HEIGHT: 69 IN | SYSTOLIC BLOOD PRESSURE: 118 MMHG | BODY MASS INDEX: 29.47 KG/M2 | DIASTOLIC BLOOD PRESSURE: 86 MMHG | WEIGHT: 199 LBS

## 2021-05-03 DIAGNOSIS — M25.461 PAIN AND SWELLING OF RIGHT KNEE: ICD-10-CM

## 2021-05-03 DIAGNOSIS — M17.11 PRIMARY LOCALIZED OSTEOARTHRITIS OF RIGHT KNEE: Primary | ICD-10-CM

## 2021-05-03 DIAGNOSIS — M25.561 PAIN AND SWELLING OF RIGHT KNEE: ICD-10-CM

## 2021-05-03 DIAGNOSIS — Z79.899 ENCOUNTER FOR LONG-TERM (CURRENT) USE OF MEDICATIONS: ICD-10-CM

## 2021-05-03 DIAGNOSIS — M25.661 DECREASED RANGE OF MOTION (ROM) OF RIGHT KNEE: ICD-10-CM

## 2021-05-03 DIAGNOSIS — Z12.11 SCREENING FOR COLON CANCER: ICD-10-CM

## 2021-05-03 LAB — HEMOCCULT STL QL: NEGATIVE

## 2021-05-03 PROCEDURE — 3074F SYST BP LT 130 MM HG: CPT | Performed by: FAMILY MEDICINE

## 2021-05-03 PROCEDURE — 3079F DIAST BP 80-89 MM HG: CPT | Performed by: FAMILY MEDICINE

## 2021-05-03 PROCEDURE — G0328 FECAL BLOOD SCRN IMMUNOASSAY: HCPCS | Performed by: FAMILY MEDICINE

## 2021-05-03 PROCEDURE — 20611 DRAIN/INJ JOINT/BURSA W/US: CPT | Performed by: FAMILY MEDICINE

## 2021-05-03 PROCEDURE — 99204 OFFICE O/P NEW MOD 45 MIN: CPT | Performed by: FAMILY MEDICINE

## 2021-05-03 RX ORDER — TRIAMCINOLONE ACETONIDE 40 MG/ML
40 INJECTION, SUSPENSION INTRA-ARTICULAR; INTRAMUSCULAR
Status: COMPLETED | OUTPATIENT
Start: 2021-05-03 | End: 2021-05-03

## 2021-05-03 RX ORDER — LIDOCAINE HYDROCHLORIDE 10 MG/ML
2 INJECTION, SOLUTION INFILTRATION; PERINEURAL
Status: COMPLETED | OUTPATIENT
Start: 2021-05-03 | End: 2021-05-03

## 2021-05-03 RX ORDER — BUPIVACAINE HYDROCHLORIDE 5 MG/ML
2 INJECTION, SOLUTION PERINEURAL
Status: COMPLETED | OUTPATIENT
Start: 2021-05-03 | End: 2021-05-03

## 2021-05-03 RX ADMIN — BUPIVACAINE HYDROCHLORIDE 2 ML: 5 INJECTION, SOLUTION PERINEURAL at 10:03

## 2021-05-03 RX ADMIN — TRIAMCINOLONE ACETONIDE 40 MG: 40 INJECTION, SUSPENSION INTRA-ARTICULAR; INTRAMUSCULAR at 10:03

## 2021-05-03 RX ADMIN — LIDOCAINE HYDROCHLORIDE 2 ML: 10 INJECTION, SOLUTION INFILTRATION; PERINEURAL at 10:03

## 2021-05-04 PROBLEM — M17.11 PRIMARY OSTEOARTHRITIS OF RIGHT KNEE: Status: ACTIVE | Noted: 2021-05-04

## 2021-05-04 PROBLEM — G89.29 CHRONIC PAIN OF RIGHT KNEE: Status: ACTIVE | Noted: 2021-05-04

## 2021-05-04 PROBLEM — M25.561 CHRONIC PAIN OF RIGHT KNEE: Status: ACTIVE | Noted: 2021-05-04

## 2021-05-04 PROBLEM — M25.661 LIMITATION OF JOINT MOTION OF RIGHT KNEE: Status: ACTIVE | Noted: 2021-05-04

## 2021-05-04 PROBLEM — M62.81 MUSCLE WEAKNESS: Status: ACTIVE | Noted: 2021-05-04

## 2021-05-11 ENCOUNTER — OFFICE VISIT (OUTPATIENT)
Dept: PHYSICAL THERAPY | Age: 71
End: 2021-05-11
Attending: FAMILY MEDICINE

## 2021-05-11 DIAGNOSIS — M17.11 PRIMARY OSTEOARTHRITIS OF RIGHT KNEE: Primary | ICD-10-CM

## 2021-05-11 DIAGNOSIS — G89.29 CHRONIC PAIN OF RIGHT KNEE: ICD-10-CM

## 2021-05-11 DIAGNOSIS — M62.81 MUSCLE WEAKNESS: ICD-10-CM

## 2021-05-11 DIAGNOSIS — M25.661 LIMITATION OF JOINT MOTION OF RIGHT KNEE: ICD-10-CM

## 2021-05-11 DIAGNOSIS — M25.561 CHRONIC PAIN OF RIGHT KNEE: ICD-10-CM

## 2021-05-11 PROCEDURE — 97112 NEUROMUSCULAR REEDUCATION: CPT | Performed by: PHYSICAL THERAPIST

## 2021-05-11 PROCEDURE — 97162 PT EVAL MOD COMPLEX 30 MIN: CPT | Performed by: PHYSICAL THERAPIST

## 2021-05-11 ASSESSMENT — MOVEMENT AND STRENGTH ASSESSMENTS
WALKING 2 BLOCKS: A LITTLE BIT OF DIFFICULTY
GETTING INTO OR OUT OF A CAR: A LITTLE BIT OF DIFFICULTY
HOPPING: MODERATE DIFFICULTY
PERFORMING LIGHT ACTIVITES AROUND YOUR HOME: A LITTLE BIT OF DIFFICULTY
ROLLING OVER IN BED: A LITTLE BIT OF DIFFICULTY
ANY OF YOUR USUAL WORK, HOUSEWORK OR SCHOOL ACTIVITIES: A LITTLE BIT OF DIFFICULTY
SQUATTING: A LITTLE BIT OF DIFFICULTY
RUNNING ON UNEVEN GROUND: QUITE A BIT OF DIFFICULTY
GOING UP OR DOWN 10 STAIRS (ABOUT 1 FLIGHT OF STAIRS): A LITTLE BIT OF DIFFICULTY
RUNNING ON EVEN GROUND: QUITE A BIT OF DIFFICULTY
MAKING SHARP TURNS WHILE RUNNING FAST: EXTREME DIFFICULTY OR UNABLE TO PERFORM ACTIVITY
STANDING FOR 1 HOUR: MODERATE DIFFICULTY
LIFTING AN OBJECT, LIKE A BAG OF GROCERIES, FROM THE FLOOR: A LITTLE BIT OF DIFFICULTY
PUTTING ON YOUR SHOES OR SOCKS: A LITTLE BIT OF DIFFICULTY
PERFORMING HEAVY ACTIVITIES AROUND YOUR HOME: QUITE A BIT OF DIFFICULTY
TOTAL SCORE: 61.25
WALKING BETWEEN ROOMS: NO DIFFICULTY
SITTING FOR 1 HOUR: A LITTLE BIT OF DIFFICULTY
WALKING A MILE: MODERATE DIFFICULTY
YOUR USUAL HOBBIES, RECREATIONAL OR SPORTING ACTIVIITIES: A LITTLE BIT OF DIFFICULTY
GETTING INTO OR OUT OF THE BATH: A LITTLE BIT OF DIFFICULTY

## 2021-05-11 ASSESSMENT — ENCOUNTER SYMPTOMS
PAIN SCALE AT LOWEST: 3
ALLEVIATING FACTORS: SUPPORTIVE DEVICE
PAIN LOCATION: RIGHT KNEE
ALLEVIATING FACTORS: CHANGE IN POSITION
ALLEVIATING FACTORS: AVOIDING MOVEMENT IN INVOLVED AREA
PAIN SCALE AT HIGHEST: 6
PAIN SEVERITY NOW: 3
PAIN FREQUENCY: INTERMITTENT
ALLEVIATING FACTORS: TOPICAL AGENTS/PATCH
QUALITY: SHARP
SUBJECTIVE PAIN PROGRESSION: WORSENING

## 2021-05-18 ENCOUNTER — OFFICE VISIT (OUTPATIENT)
Dept: PHYSICAL THERAPY | Age: 71
End: 2021-05-18

## 2021-05-18 DIAGNOSIS — G89.29 CHRONIC PAIN OF RIGHT KNEE: ICD-10-CM

## 2021-05-18 DIAGNOSIS — M17.11 PRIMARY OSTEOARTHRITIS OF RIGHT KNEE: Primary | ICD-10-CM

## 2021-05-18 DIAGNOSIS — M25.561 CHRONIC PAIN OF RIGHT KNEE: ICD-10-CM

## 2021-05-18 DIAGNOSIS — M25.661 LIMITATION OF JOINT MOTION OF RIGHT KNEE: ICD-10-CM

## 2021-05-18 DIAGNOSIS — M62.81 MUSCLE WEAKNESS: ICD-10-CM

## 2021-05-18 PROCEDURE — 97112 NEUROMUSCULAR REEDUCATION: CPT | Performed by: PHYSICAL THERAPIST

## 2021-05-18 PROCEDURE — 97530 THERAPEUTIC ACTIVITIES: CPT | Performed by: PHYSICAL THERAPIST

## 2021-05-20 ENCOUNTER — E-ADVICE (OUTPATIENT)
Dept: FAMILY MEDICINE | Age: 71
End: 2021-05-20

## 2021-05-20 DIAGNOSIS — R21 RASH: Primary | ICD-10-CM

## 2021-05-25 ENCOUNTER — OFFICE VISIT (OUTPATIENT)
Dept: PHYSICAL THERAPY | Age: 71
End: 2021-05-25

## 2021-05-25 VITALS
TEMPERATURE: 98.3 F | OXYGEN SATURATION: 96 % | RESPIRATION RATE: 16 BRPM | DIASTOLIC BLOOD PRESSURE: 74 MMHG | SYSTOLIC BLOOD PRESSURE: 110 MMHG | TEMPERATURE: 97.4 F | RESPIRATION RATE: 20 BRPM | WEIGHT: 191 LBS | HEART RATE: 72 BPM | SYSTOLIC BLOOD PRESSURE: 144 MMHG | BODY MASS INDEX: 28.29 KG/M2 | HEART RATE: 76 BPM | DIASTOLIC BLOOD PRESSURE: 73 MMHG | HEIGHT: 69 IN

## 2021-05-25 DIAGNOSIS — M17.11 PRIMARY OSTEOARTHRITIS OF RIGHT KNEE: Primary | ICD-10-CM

## 2021-05-25 DIAGNOSIS — M62.81 MUSCLE WEAKNESS: ICD-10-CM

## 2021-05-25 DIAGNOSIS — G89.29 CHRONIC PAIN OF RIGHT KNEE: ICD-10-CM

## 2021-05-25 DIAGNOSIS — M25.561 CHRONIC PAIN OF RIGHT KNEE: ICD-10-CM

## 2021-05-25 DIAGNOSIS — M25.661 LIMITATION OF JOINT MOTION OF RIGHT KNEE: ICD-10-CM

## 2021-05-25 PROCEDURE — 97112 NEUROMUSCULAR REEDUCATION: CPT | Performed by: PHYSICAL THERAPIST

## 2021-05-25 PROCEDURE — 97530 THERAPEUTIC ACTIVITIES: CPT | Performed by: PHYSICAL THERAPIST

## 2021-05-25 PROCEDURE — 97110 THERAPEUTIC EXERCISES: CPT | Performed by: PHYSICAL THERAPIST

## 2021-06-01 ENCOUNTER — OFFICE VISIT (OUTPATIENT)
Dept: PHYSICAL THERAPY | Age: 71
End: 2021-06-01

## 2021-06-01 DIAGNOSIS — M25.561 CHRONIC PAIN OF RIGHT KNEE: ICD-10-CM

## 2021-06-01 DIAGNOSIS — G89.29 CHRONIC PAIN OF RIGHT KNEE: ICD-10-CM

## 2021-06-01 DIAGNOSIS — M17.11 PRIMARY OSTEOARTHRITIS OF RIGHT KNEE: Primary | ICD-10-CM

## 2021-06-01 DIAGNOSIS — M62.81 MUSCLE WEAKNESS: ICD-10-CM

## 2021-06-01 DIAGNOSIS — M25.661 LIMITATION OF JOINT MOTION OF RIGHT KNEE: ICD-10-CM

## 2021-06-01 PROCEDURE — 97530 THERAPEUTIC ACTIVITIES: CPT | Performed by: PHYSICAL THERAPIST

## 2021-06-01 PROCEDURE — 97112 NEUROMUSCULAR REEDUCATION: CPT | Performed by: PHYSICAL THERAPIST

## 2021-06-01 PROCEDURE — 97110 THERAPEUTIC EXERCISES: CPT | Performed by: PHYSICAL THERAPIST

## 2021-06-08 ENCOUNTER — OFFICE VISIT (OUTPATIENT)
Dept: PHYSICAL THERAPY | Age: 71
End: 2021-06-08

## 2021-06-08 DIAGNOSIS — M17.11 PRIMARY OSTEOARTHRITIS OF RIGHT KNEE: Primary | ICD-10-CM

## 2021-06-08 DIAGNOSIS — M25.661 LIMITATION OF JOINT MOTION OF RIGHT KNEE: ICD-10-CM

## 2021-06-08 DIAGNOSIS — M25.561 CHRONIC PAIN OF RIGHT KNEE: ICD-10-CM

## 2021-06-08 DIAGNOSIS — G89.29 CHRONIC PAIN OF RIGHT KNEE: ICD-10-CM

## 2021-06-08 DIAGNOSIS — M62.81 MUSCLE WEAKNESS: ICD-10-CM

## 2021-06-08 PROCEDURE — 97110 THERAPEUTIC EXERCISES: CPT | Performed by: PHYSICAL THERAPIST

## 2021-06-08 PROCEDURE — 97530 THERAPEUTIC ACTIVITIES: CPT | Performed by: PHYSICAL THERAPIST

## 2021-06-08 PROCEDURE — 97112 NEUROMUSCULAR REEDUCATION: CPT | Performed by: PHYSICAL THERAPIST

## 2021-06-08 ASSESSMENT — MOVEMENT AND STRENGTH ASSESSMENTS
PUTTING ON YOUR SHOES OR SOCKS: A LITTLE BIT OF DIFFICULTY
STANDING FOR 1 HOUR: A LITTLE BIT OF DIFFICULTY
SQUATTING: A LITTLE BIT OF DIFFICULTY
PERFORMING LIGHT ACTIVITES AROUND YOUR HOME: NO DIFFICULTY
RUNNING ON EVEN GROUND: QUITE A BIT OF DIFFICULTY
MAKING SHARP TURNS WHILE RUNNING FAST: QUITE A BIT OF DIFFICULTY
LIFTING AN OBJECT, LIKE A BAG OF GROCERIES, FROM THE FLOOR: A LITTLE BIT OF DIFFICULTY
ROLLING OVER IN BED: A LITTLE BIT OF DIFFICULTY
WALKING 2 BLOCKS: NO DIFFICULTY
RUNNING ON UNEVEN GROUND: QUITE A BIT OF DIFFICULTY
PERFORMING HEAVY ACTIVITIES AROUND YOUR HOME: A LITTLE BIT OF DIFFICULTY
HOPPING: MODERATE DIFFICULTY
ANY OF YOUR USUAL WORK, HOUSEWORK OR SCHOOL ACTIVITIES: NO DIFFICULTY
WALKING BETWEEN ROOMS: NO DIFFICULTY
GOING UP OR DOWN 10 STAIRS (ABOUT 1 FLIGHT OF STAIRS): A LITTLE BIT OF DIFFICULTY
SITTING FOR 1 HOUR: NO DIFFICULTY
WALKING A MILE: A LITTLE BIT OF DIFFICULTY
GETTING INTO OR OUT OF A CAR: A LITTLE BIT OF DIFFICULTY
TOTAL SCORE: 75
YOUR USUAL HOBBIES, RECREATIONAL OR SPORTING ACTIVIITIES: NO DIFFICULTY
GETTING INTO OR OUT OF THE BATH: NO DIFFICULTY

## 2021-06-08 ASSESSMENT — ENCOUNTER SYMPTOMS
PAIN SEVERITY NOW: 2
PAIN FREQUENCY: INTERMITTENT

## 2021-06-21 ASSESSMENT — ENCOUNTER SYMPTOMS
ADENOPATHY: 0
NUMBNESS: 0
SHORTNESS OF BREATH: 0
BACK PAIN: 0
DIARRHEA: 0
NAUSEA: 0
WHEEZING: 0
CHILLS: 0
WEAKNESS: 0
ACTIVITY CHANGE: 0
WOUND: 0
BRUISES/BLEEDS EASILY: 0
VOMITING: 0
FEVER: 0
PHOTOPHOBIA: 0
CHEST TIGHTNESS: 0

## 2021-06-22 ENCOUNTER — OFFICE VISIT (OUTPATIENT)
Dept: SPORTS MEDICINE | Age: 71
End: 2021-06-22

## 2021-06-22 VITALS — HEIGHT: 69 IN | WEIGHT: 199 LBS | BODY MASS INDEX: 29.47 KG/M2

## 2021-06-22 DIAGNOSIS — M17.11 PRIMARY LOCALIZED OSTEOARTHRITIS OF RIGHT KNEE: Primary | ICD-10-CM

## 2021-06-22 DIAGNOSIS — M25.561 PAIN AND SWELLING OF RIGHT KNEE: ICD-10-CM

## 2021-06-22 DIAGNOSIS — M25.461 PAIN AND SWELLING OF RIGHT KNEE: ICD-10-CM

## 2021-06-22 DIAGNOSIS — M25.661 DECREASED RANGE OF MOTION (ROM) OF RIGHT KNEE: ICD-10-CM

## 2021-06-22 PROCEDURE — 99213 OFFICE O/P EST LOW 20 MIN: CPT | Performed by: FAMILY MEDICINE

## 2021-06-22 ASSESSMENT — ENCOUNTER SYMPTOMS
ABDOMINAL PAIN: 0
COLOR CHANGE: 1
EYE PAIN: 0
SORE THROAT: 0
SINUS PAIN: 0
AGITATION: 0
CONSTIPATION: 0
APPETITE CHANGE: 0
SINUS PRESSURE: 0
SEIZURES: 0
HEADACHES: 0
NERVOUS/ANXIOUS: 0
FATIGUE: 0
SLEEP DISTURBANCE: 0

## 2021-07-26 RX ORDER — ATORVASTATIN CALCIUM 10 MG/1
TABLET, FILM COATED ORAL
Qty: 90 TABLET | Refills: 0 | Status: SHIPPED | OUTPATIENT
Start: 2021-07-26 | End: 2021-10-18

## 2021-08-18 ENCOUNTER — TELEPHONE (OUTPATIENT)
Dept: SPORTS MEDICINE | Age: 71
End: 2021-08-18

## 2021-10-18 RX ORDER — ATORVASTATIN CALCIUM 10 MG/1
TABLET, FILM COATED ORAL
Qty: 90 TABLET | Refills: 0 | Status: SHIPPED | OUTPATIENT
Start: 2021-10-18 | End: 2021-12-30 | Stop reason: SDUPTHER

## 2021-12-22 ENCOUNTER — TELEPHONE (OUTPATIENT)
Dept: FAMILY MEDICINE | Age: 71
End: 2021-12-22

## 2021-12-22 DIAGNOSIS — Z01.818 PRE-OP TESTING: Primary | ICD-10-CM

## 2021-12-27 ENCOUNTER — TELEPHONE (OUTPATIENT)
Dept: FAMILY MEDICINE | Age: 71
End: 2021-12-27

## 2021-12-27 DIAGNOSIS — Z01.818 PRE-OP TESTING: Primary | ICD-10-CM

## 2021-12-30 RX ORDER — ATORVASTATIN CALCIUM 10 MG/1
10 TABLET, FILM COATED ORAL NIGHTLY
Qty: 90 TABLET | Refills: 0 | Status: SHIPPED | OUTPATIENT
Start: 2021-12-30 | End: 2022-06-30

## 2022-01-11 ENCOUNTER — LAB SERVICES (OUTPATIENT)
Dept: LAB | Age: 72
End: 2022-01-11

## 2022-01-11 DIAGNOSIS — Z01.818 PRE-OP TESTING: ICD-10-CM

## 2022-01-11 LAB
ALBUMIN SERPL-MCNC: 4.3 G/DL (ref 3.6–5.1)
ALP SERPL-CCNC: 95 U/L (ref 45–115)
ALT SERPL W/O P-5'-P-CCNC: 34 U/L (ref 5–49)
AST SERPL-CCNC: 23 U/L (ref 14–43)
BASOPHIL %: 0.8 % (ref 0–1.2)
BASOPHIL ABSOLUTE #: 0 10*3/UL (ref 0–0.1)
BILIRUB SERPL-MCNC: 1 MG/DL (ref 0–1.3)
BILIRUBIN URINE: NEGATIVE
BLOOD URINE: NEGATIVE
BUN SERPL-MCNC: 17 MG/DL (ref 6–27)
CALCIUM SERPL-MCNC: 9.4 MG/DL (ref 8.6–10.6)
CHLORIDE SERPL-SCNC: 105 MMOL/L (ref 96–107)
CLARITY: CLEAR
CO2 SERPL-SCNC: 30 MMOL/L (ref 22–32)
COLOR: YELLOW
CREAT SERPL-MCNC: 0.9 MG/DL (ref 0.6–1.6)
DIFFERENTIAL TYPE: ABNORMAL
EOSINOPHIL %: 5.3 % (ref 0–10)
EOSINOPHIL ABSOLUTE #: 0.2 10*3/UL (ref 0–0.5)
GFR SERPL CREATININE-BSD FRML MDRD: >60 ML/MIN/{1.73M2}
GFR SERPL CREATININE-BSD FRML MDRD: >60 ML/MIN/{1.73M2}
GLUCOSE QUALITATIVE U: NEGATIVE
GLUCOSE SERPL-MCNC: 95 MG/DL (ref 70–200)
HEMATOCRIT: 46.4 % (ref 40–51)
HEMOGLOBIN: 15.5 G/DL (ref 13.7–17.5)
IMMATURE GRANULOCYTE ABSOLUTE: 0.01 10*3/UL (ref 0–0.05)
IMMATURE GRANULOCYTE PERCENT: 0.3 % (ref 0–0.5)
INTERNATIONAL NORMALIZED RATIO: 1
KETONES, URINE: NEGATIVE
LEUKOCYTE ESTERASE URINE: NEGATIVE
LYMPH PERCENT: 34.9 % (ref 20.5–51.1)
LYMPHOCYTE ABSOLUTE #: 1.4 10*3/UL (ref 1.2–3.4)
MEAN CORPUSCULAR HGB CONCENTRATION: 33.4 % (ref 32–36)
MEAN CORPUSCULAR HGB: 31.3 PG (ref 27–34)
MEAN CORPUSCULAR VOLUME: 93.5 FL (ref 79–95)
MEAN PLATELET VOLUME: 10.5 FL (ref 8.6–12.4)
MONOCYTE ABSOLUTE #: 0.4 10*3/UL (ref 0.2–0.9)
MONOCYTE PERCENT: 8.9 % (ref 4.3–12.9)
NEUTROPHIL ABSOLUTE #: 2 10*3/UL (ref 1.4–6.5)
NEUTROPHIL PERCENT: 49.8 % (ref 34–73.5)
NITRITE URINE: NEGATIVE
PH URINE: 6 (ref 5–7)
PLATELET COUNT: 228 10*3/UL (ref 150–400)
POTASSIUM SERPL-SCNC: 4.7 MMOL/L (ref 3.5–5.3)
PROT SERPL-MCNC: 7.1 G/DL (ref 6.4–8.5)
PROTHROMBIN TIME, THERAPEUTIC: 10.7 S (ref 9.8–12.1)
PTT: 24.5 S (ref 24–38)
RED BLOOD CELL COUNT: 4.96 10*6/UL (ref 3.9–5.7)
RED CELL DISTRIBUTION WIDTH: 13.4 % (ref 11.3–14.8)
SODIUM SERPL-SCNC: 139 MMOL/L (ref 136–146)
SPECIFIC GRAVITY URINE: 1.01 (ref 1–1.03)
URINE PROTEIN, QUAL (DIPSTICK): NEGATIVE
UROBILINOGEN URINE: <2
WHITE BLOOD CELL COUNT: 3.9 10*3/UL (ref 4–10)

## 2022-01-11 PROCEDURE — 85025 COMPLETE CBC W/AUTO DIFF WBC: CPT | Performed by: FAMILY MEDICINE

## 2022-01-11 PROCEDURE — 85610 PROTHROMBIN TIME: CPT | Performed by: FAMILY MEDICINE

## 2022-01-11 PROCEDURE — 81003 URINALYSIS AUTO W/O SCOPE: CPT | Performed by: FAMILY MEDICINE

## 2022-01-11 PROCEDURE — 36415 COLL VENOUS BLD VENIPUNCTURE: CPT | Performed by: FAMILY MEDICINE

## 2022-01-11 PROCEDURE — 85730 THROMBOPLASTIN TIME PARTIAL: CPT | Performed by: FAMILY MEDICINE

## 2022-01-11 PROCEDURE — 80053 COMPREHEN METABOLIC PANEL: CPT | Performed by: FAMILY MEDICINE

## 2022-01-11 PROCEDURE — 87081 CULTURE SCREEN ONLY: CPT | Performed by: FAMILY MEDICINE

## 2022-01-11 RX ORDER — ATORVASTATIN CALCIUM 10 MG/1
10 TABLET, FILM COATED ORAL NIGHTLY
COMMUNITY

## 2022-01-13 LAB — FINAL REPORT: NORMAL

## 2022-01-19 ENCOUNTER — TELEPHONE (OUTPATIENT)
Dept: FAMILY MEDICINE | Age: 72
End: 2022-01-19

## 2022-01-25 ENCOUNTER — APPOINTMENT (OUTPATIENT)
Dept: FAMILY MEDICINE | Age: 72
End: 2022-01-25

## 2022-01-25 ENCOUNTER — OFFICE VISIT (OUTPATIENT)
Dept: FAMILY MEDICINE | Age: 72
End: 2022-01-25

## 2022-01-25 ENCOUNTER — PRE-EVAL (OUTPATIENT)
Dept: FAMILY MEDICINE | Age: 72
End: 2022-01-25

## 2022-01-25 VITALS
WEIGHT: 185.2 LBS | DIASTOLIC BLOOD PRESSURE: 76 MMHG | BODY MASS INDEX: 27.35 KG/M2 | TEMPERATURE: 97 F | SYSTOLIC BLOOD PRESSURE: 128 MMHG | HEART RATE: 68 BPM

## 2022-01-25 DIAGNOSIS — E78.00 PURE HYPERCHOLESTEROLEMIA: ICD-10-CM

## 2022-01-25 DIAGNOSIS — Z12.11 COLON CANCER SCREENING: ICD-10-CM

## 2022-01-25 DIAGNOSIS — D72.819 LEUKOPENIA, UNSPECIFIED TYPE: ICD-10-CM

## 2022-01-25 DIAGNOSIS — Z01.818 PRE-OP EXAM: ICD-10-CM

## 2022-01-25 DIAGNOSIS — M17.11 PRIMARY OSTEOARTHRITIS OF RIGHT KNEE: Primary | ICD-10-CM

## 2022-01-25 DIAGNOSIS — E78.2 HYPERLIPEMIA, MIXED: ICD-10-CM

## 2022-01-25 DIAGNOSIS — Z13.6 SCREENING FOR CARDIOVASCULAR CONDITION: ICD-10-CM

## 2022-01-25 PROCEDURE — 99214 OFFICE O/P EST MOD 30 MIN: CPT | Performed by: FAMILY MEDICINE

## 2022-01-25 PROCEDURE — 3078F DIAST BP <80 MM HG: CPT | Performed by: FAMILY MEDICINE

## 2022-01-25 PROCEDURE — 93000 ELECTROCARDIOGRAM COMPLETE: CPT | Performed by: FAMILY MEDICINE

## 2022-01-25 PROCEDURE — 3074F SYST BP LT 130 MM HG: CPT | Performed by: FAMILY MEDICINE

## 2022-01-25 ASSESSMENT — PATIENT HEALTH QUESTIONNAIRE - PHQ9
SUM OF ALL RESPONSES TO PHQ9 QUESTIONS 1 AND 2: 0
1. LITTLE INTEREST OR PLEASURE IN DOING THINGS: NOT AT ALL
CLINICAL INTERPRETATION OF PHQ2 SCORE: NO FURTHER SCREENING NEEDED
SUM OF ALL RESPONSES TO PHQ9 QUESTIONS 1 AND 2: 0
2. FEELING DOWN, DEPRESSED OR HOPELESS: NOT AT ALL

## 2022-01-27 ENCOUNTER — ANCILLARY PROCEDURE (OUTPATIENT)
Dept: ULTRASOUND IMAGING | Age: 72
End: 2022-01-27
Attending: FAMILY MEDICINE

## 2022-01-27 ENCOUNTER — ANESTHESIA EVENT (OUTPATIENT)
Dept: SURGERY | Facility: HOSPITAL | Age: 72
End: 2022-01-27
Payer: COMMERCIAL

## 2022-01-27 DIAGNOSIS — Z13.6 SCREENING FOR CARDIOVASCULAR CONDITION: ICD-10-CM

## 2022-01-27 PROCEDURE — 76706 US ABDL AORTA SCREEN AAA: CPT | Performed by: RADIOLOGY

## 2022-01-31 ENCOUNTER — LAB ENCOUNTER (OUTPATIENT)
Dept: LAB | Age: 72
End: 2022-01-31
Attending: ORTHOPAEDIC SURGERY
Payer: COMMERCIAL

## 2022-01-31 ENCOUNTER — LABORATORY ENCOUNTER (OUTPATIENT)
Dept: LAB | Age: 72
End: 2022-01-31
Attending: ORTHOPAEDIC SURGERY
Payer: COMMERCIAL

## 2022-01-31 DIAGNOSIS — M17.11 PRIMARY OSTEOARTHRITIS OF RIGHT KNEE: ICD-10-CM

## 2022-01-31 LAB
ANTIBODY SCREEN: NEGATIVE
RH BLOOD TYPE: POSITIVE

## 2022-01-31 PROCEDURE — 86900 BLOOD TYPING SEROLOGIC ABO: CPT

## 2022-01-31 PROCEDURE — 86850 RBC ANTIBODY SCREEN: CPT

## 2022-01-31 PROCEDURE — 86901 BLOOD TYPING SEROLOGIC RH(D): CPT

## 2022-02-01 ENCOUNTER — TELEPHONE (OUTPATIENT)
Dept: FAMILY MEDICINE | Age: 72
End: 2022-02-01

## 2022-02-01 DIAGNOSIS — M17.11 PRIMARY OSTEOARTHRITIS OF RIGHT KNEE: Primary | ICD-10-CM

## 2022-02-01 LAB — SARS-COV-2 RNA RESP QL NAA+PROBE: NOT DETECTED

## 2022-02-02 ENCOUNTER — HOSPITAL ENCOUNTER (OUTPATIENT)
Facility: HOSPITAL | Age: 72
Discharge: HOME HEALTH | End: 2022-02-03
Attending: ORTHOPAEDIC SURGERY | Admitting: ORTHOPAEDIC SURGERY
Payer: COMMERCIAL

## 2022-02-02 ENCOUNTER — ANESTHESIA (OUTPATIENT)
Dept: SURGERY | Facility: HOSPITAL | Age: 72
End: 2022-02-02
Payer: COMMERCIAL

## 2022-02-02 ENCOUNTER — EXTERNAL RECORD (OUTPATIENT)
Dept: HEALTH INFORMATION MANAGEMENT | Facility: OTHER | Age: 72
End: 2022-02-02

## 2022-02-02 DIAGNOSIS — M17.11 PRIMARY OSTEOARTHRITIS OF RIGHT KNEE: Primary | ICD-10-CM

## 2022-02-02 PROCEDURE — 97530 THERAPEUTIC ACTIVITIES: CPT

## 2022-02-02 PROCEDURE — 76942 ECHO GUIDE FOR BIOPSY: CPT | Performed by: ANESTHESIOLOGY

## 2022-02-02 PROCEDURE — 97161 PT EVAL LOW COMPLEX 20 MIN: CPT

## 2022-02-02 PROCEDURE — 88305 TISSUE EXAM BY PATHOLOGIST: CPT | Performed by: ORTHOPAEDIC SURGERY

## 2022-02-02 PROCEDURE — 88311 DECALCIFY TISSUE: CPT | Performed by: ORTHOPAEDIC SURGERY

## 2022-02-02 PROCEDURE — 0SRC0J9 REPLACEMENT OF RIGHT KNEE JOINT WITH SYNTHETIC SUBSTITUTE, CEMENTED, OPEN APPROACH: ICD-10-PCS | Performed by: ORTHOPAEDIC SURGERY

## 2022-02-02 DEVICE — ATTUNE KNEE SYSTEM TIBIAL BASE ROTATING PLATFORM SIZE 8 CEMENTED
Type: IMPLANTABLE DEVICE | Site: KNEE | Status: FUNCTIONAL
Brand: ATTUNE

## 2022-02-02 DEVICE — ATTUNE KNEE SYSTEM FEMORAL POSTERIOR STABILIZED SIZE 7 RIGHT CEMENTED
Type: IMPLANTABLE DEVICE | Site: KNEE | Status: FUNCTIONAL
Brand: ATTUNE

## 2022-02-02 DEVICE — ATTUNE PATELLA MEDIALIZED DOME 41MM CEMENTED AOX
Type: IMPLANTABLE DEVICE | Site: KNEE | Status: FUNCTIONAL
Brand: ATTUNE

## 2022-02-02 DEVICE — SMARTSET HV HIGH VISCOSITY BONE CEMENT 40G
Type: IMPLANTABLE DEVICE | Site: KNEE | Status: FUNCTIONAL
Brand: SMARTSET

## 2022-02-02 DEVICE — ATTUNE KNEE SYSTEM TIBIAL INSERT ROTATING PLATFORM POSTERIOR STABILIZED 7 10MM AOX
Type: IMPLANTABLE DEVICE | Site: KNEE | Status: FUNCTIONAL
Brand: ATTUNE

## 2022-02-02 RX ORDER — MELATONIN
325
Status: DISCONTINUED | OUTPATIENT
Start: 2022-02-02 | End: 2022-02-03

## 2022-02-02 RX ORDER — DEXAMETHASONE SODIUM PHOSPHATE 4 MG/ML
VIAL (ML) INJECTION AS NEEDED
Status: DISCONTINUED | OUTPATIENT
Start: 2022-02-02 | End: 2022-02-02 | Stop reason: SURG

## 2022-02-02 RX ORDER — NALOXONE HYDROCHLORIDE 0.4 MG/ML
80 INJECTION, SOLUTION INTRAMUSCULAR; INTRAVENOUS; SUBCUTANEOUS AS NEEDED
Status: DISCONTINUED | OUTPATIENT
Start: 2022-02-02 | End: 2022-02-02 | Stop reason: HOSPADM

## 2022-02-02 RX ORDER — PHENYLEPHRINE HCL 10 MG/ML
VIAL (ML) INJECTION AS NEEDED
Status: DISCONTINUED | OUTPATIENT
Start: 2022-02-02 | End: 2022-02-02 | Stop reason: SURG

## 2022-02-02 RX ORDER — HYDROMORPHONE HYDROCHLORIDE 1 MG/ML
0.4 INJECTION, SOLUTION INTRAMUSCULAR; INTRAVENOUS; SUBCUTANEOUS EVERY 2 HOUR PRN
Status: DISCONTINUED | OUTPATIENT
Start: 2022-02-02 | End: 2022-02-03

## 2022-02-02 RX ORDER — ASPIRIN 325 MG
325 TABLET, DELAYED RELEASE (ENTERIC COATED) ORAL 2 TIMES DAILY
Status: DISCONTINUED | OUTPATIENT
Start: 2022-02-02 | End: 2022-02-03

## 2022-02-02 RX ORDER — SENNOSIDES 8.6 MG
17.2 TABLET ORAL NIGHTLY
Status: DISCONTINUED | OUTPATIENT
Start: 2022-02-02 | End: 2022-02-03

## 2022-02-02 RX ORDER — ONDANSETRON 2 MG/ML
4 INJECTION INTRAMUSCULAR; INTRAVENOUS EVERY 4 HOURS PRN
Status: DISCONTINUED | OUTPATIENT
Start: 2022-02-02 | End: 2022-02-03

## 2022-02-02 RX ORDER — TRANEXAMIC ACID 10 MG/ML
1000 INJECTION, SOLUTION INTRAVENOUS ONCE
Status: COMPLETED | OUTPATIENT
Start: 2022-02-02 | End: 2022-02-02

## 2022-02-02 RX ORDER — DIPHENHYDRAMINE HYDROCHLORIDE 50 MG/ML
12.5 INJECTION INTRAMUSCULAR; INTRAVENOUS EVERY 4 HOURS PRN
Status: DISCONTINUED | OUTPATIENT
Start: 2022-02-02 | End: 2022-02-03

## 2022-02-02 RX ORDER — HYDROCODONE BITARTRATE AND ACETAMINOPHEN 10; 325 MG/1; MG/1
2 TABLET ORAL AS NEEDED
Status: DISCONTINUED | OUTPATIENT
Start: 2022-02-02 | End: 2022-02-02 | Stop reason: HOSPADM

## 2022-02-02 RX ORDER — CEFAZOLIN SODIUM/WATER 2 G/20 ML
2 SYRINGE (ML) INTRAVENOUS ONCE
Status: COMPLETED | OUTPATIENT
Start: 2022-02-02 | End: 2022-02-02

## 2022-02-02 RX ORDER — BUPIVACAINE HYDROCHLORIDE 7.5 MG/ML
INJECTION, SOLUTION INTRASPINAL AS NEEDED
Status: DISCONTINUED | OUTPATIENT
Start: 2022-02-02 | End: 2022-02-02 | Stop reason: SURG

## 2022-02-02 RX ORDER — HYDROCODONE BITARTRATE AND ACETAMINOPHEN 10; 325 MG/1; MG/1
1-2 TABLET ORAL EVERY 4 HOURS PRN
Qty: 60 TABLET | Refills: 0 | Status: SHIPPED | OUTPATIENT
Start: 2022-02-02 | End: 2022-02-14

## 2022-02-02 RX ORDER — POLYETHYLENE GLYCOL 3350 17 G/17G
17 POWDER, FOR SOLUTION ORAL DAILY PRN
Status: DISCONTINUED | OUTPATIENT
Start: 2022-02-02 | End: 2022-02-03

## 2022-02-02 RX ORDER — KETOROLAC TROMETHAMINE 15 MG/ML
15 INJECTION, SOLUTION INTRAMUSCULAR; INTRAVENOUS EVERY 6 HOURS
Status: COMPLETED | OUTPATIENT
Start: 2022-02-02 | End: 2022-02-03

## 2022-02-02 RX ORDER — OXYCODONE HYDROCHLORIDE 5 MG/1
5 TABLET ORAL EVERY 4 HOURS PRN
Status: DISCONTINUED | OUTPATIENT
Start: 2022-02-02 | End: 2022-02-03

## 2022-02-02 RX ORDER — PROCHLORPERAZINE EDISYLATE 5 MG/ML
10 INJECTION INTRAMUSCULAR; INTRAVENOUS EVERY 6 HOURS PRN
Status: DISCONTINUED | OUTPATIENT
Start: 2022-02-02 | End: 2022-02-03

## 2022-02-02 RX ORDER — BUPRENORPHINE HYDROCHLORIDE 0.32 MG/ML
INJECTION INTRAMUSCULAR; INTRAVENOUS AS NEEDED
Status: DISCONTINUED | OUTPATIENT
Start: 2022-02-02 | End: 2022-02-02 | Stop reason: SURG

## 2022-02-02 RX ORDER — DIPHENHYDRAMINE HYDROCHLORIDE 50 MG/ML
25 INJECTION INTRAMUSCULAR; INTRAVENOUS ONCE AS NEEDED
Status: ACTIVE | OUTPATIENT
Start: 2022-02-02 | End: 2022-02-02

## 2022-02-02 RX ORDER — METOCLOPRAMIDE HYDROCHLORIDE 5 MG/ML
10 INJECTION INTRAMUSCULAR; INTRAVENOUS AS NEEDED
Status: DISCONTINUED | OUTPATIENT
Start: 2022-02-02 | End: 2022-02-02 | Stop reason: HOSPADM

## 2022-02-02 RX ORDER — SODIUM CHLORIDE, SODIUM LACTATE, POTASSIUM CHLORIDE, CALCIUM CHLORIDE 600; 310; 30; 20 MG/100ML; MG/100ML; MG/100ML; MG/100ML
INJECTION, SOLUTION INTRAVENOUS CONTINUOUS
Status: DISCONTINUED | OUTPATIENT
Start: 2022-02-02 | End: 2022-02-02 | Stop reason: HOSPADM

## 2022-02-02 RX ORDER — ACETAMINOPHEN 325 MG/1
TABLET ORAL
Status: COMPLETED
Start: 2022-02-02 | End: 2022-02-02

## 2022-02-02 RX ORDER — CEFAZOLIN SODIUM/WATER 2 G/20 ML
2 SYRINGE (ML) INTRAVENOUS EVERY 8 HOURS
Status: COMPLETED | OUTPATIENT
Start: 2022-02-02 | End: 2022-02-03

## 2022-02-02 RX ORDER — ONDANSETRON 2 MG/ML
4 INJECTION INTRAMUSCULAR; INTRAVENOUS AS NEEDED
Status: DISCONTINUED | OUTPATIENT
Start: 2022-02-02 | End: 2022-02-02 | Stop reason: HOSPADM

## 2022-02-02 RX ORDER — ATORVASTATIN CALCIUM 10 MG/1
10 TABLET, FILM COATED ORAL NIGHTLY
Status: DISCONTINUED | OUTPATIENT
Start: 2022-02-02 | End: 2022-02-03

## 2022-02-02 RX ORDER — TRANEXAMIC ACID 10 MG/ML
INJECTION, SOLUTION INTRAVENOUS
Status: COMPLETED
Start: 2022-02-02 | End: 2022-02-02

## 2022-02-02 RX ORDER — MIDAZOLAM HYDROCHLORIDE 1 MG/ML
1 INJECTION INTRAMUSCULAR; INTRAVENOUS EVERY 5 MIN PRN
Status: DISCONTINUED | OUTPATIENT
Start: 2022-02-02 | End: 2022-02-02 | Stop reason: HOSPADM

## 2022-02-02 RX ORDER — HYDROMORPHONE HYDROCHLORIDE 1 MG/ML
0.2 INJECTION, SOLUTION INTRAMUSCULAR; INTRAVENOUS; SUBCUTANEOUS EVERY 2 HOUR PRN
Status: DISCONTINUED | OUTPATIENT
Start: 2022-02-02 | End: 2022-02-03

## 2022-02-02 RX ORDER — ACETAMINOPHEN 325 MG/1
650 TABLET ORAL 4 TIMES DAILY
Status: DISCONTINUED | OUTPATIENT
Start: 2022-02-02 | End: 2022-02-03

## 2022-02-02 RX ORDER — HYDROMORPHONE HYDROCHLORIDE 1 MG/ML
0.4 INJECTION, SOLUTION INTRAMUSCULAR; INTRAVENOUS; SUBCUTANEOUS EVERY 5 MIN PRN
Status: DISCONTINUED | OUTPATIENT
Start: 2022-02-02 | End: 2022-02-02 | Stop reason: HOSPADM

## 2022-02-02 RX ORDER — TRAMADOL HYDROCHLORIDE 50 MG/1
50 TABLET ORAL EVERY 6 HOURS
Status: DISCONTINUED | OUTPATIENT
Start: 2022-02-02 | End: 2022-02-03

## 2022-02-02 RX ORDER — SODIUM PHOSPHATE, DIBASIC AND SODIUM PHOSPHATE, MONOBASIC 7; 19 G/133ML; G/133ML
1 ENEMA RECTAL ONCE AS NEEDED
Status: DISCONTINUED | OUTPATIENT
Start: 2022-02-02 | End: 2022-02-03

## 2022-02-02 RX ORDER — DOCUSATE SODIUM 100 MG/1
100 CAPSULE, LIQUID FILLED ORAL 2 TIMES DAILY
Status: DISCONTINUED | OUTPATIENT
Start: 2022-02-02 | End: 2022-02-03

## 2022-02-02 RX ORDER — OXYCODONE HYDROCHLORIDE 5 MG/1
2.5 TABLET ORAL EVERY 4 HOURS PRN
Status: DISCONTINUED | OUTPATIENT
Start: 2022-02-02 | End: 2022-02-03

## 2022-02-02 RX ORDER — SODIUM CHLORIDE, SODIUM LACTATE, POTASSIUM CHLORIDE, CALCIUM CHLORIDE 600; 310; 30; 20 MG/100ML; MG/100ML; MG/100ML; MG/100ML
INJECTION, SOLUTION INTRAVENOUS CONTINUOUS
Status: DISCONTINUED | OUTPATIENT
Start: 2022-02-02 | End: 2022-02-03

## 2022-02-02 RX ORDER — ACETAMINOPHEN 500 MG
1000 TABLET ORAL ONCE
Status: DISCONTINUED | OUTPATIENT
Start: 2022-02-02 | End: 2022-02-02 | Stop reason: HOSPADM

## 2022-02-02 RX ORDER — DEXAMETHASONE SODIUM PHOSPHATE 10 MG/ML
8 INJECTION, SOLUTION INTRAMUSCULAR; INTRAVENOUS ONCE
Status: COMPLETED | OUTPATIENT
Start: 2022-02-03 | End: 2022-02-03

## 2022-02-02 RX ORDER — DIPHENHYDRAMINE HCL 25 MG
25 CAPSULE ORAL EVERY 4 HOURS PRN
Status: DISCONTINUED | OUTPATIENT
Start: 2022-02-02 | End: 2022-02-03

## 2022-02-02 RX ORDER — BISACODYL 10 MG
10 SUPPOSITORY, RECTAL RECTAL
Status: DISCONTINUED | OUTPATIENT
Start: 2022-02-02 | End: 2022-02-03

## 2022-02-02 RX ORDER — MIDAZOLAM HYDROCHLORIDE 1 MG/ML
INJECTION INTRAMUSCULAR; INTRAVENOUS AS NEEDED
Status: DISCONTINUED | OUTPATIENT
Start: 2022-02-02 | End: 2022-02-02 | Stop reason: SURG

## 2022-02-02 RX ORDER — HYDROCODONE BITARTRATE AND ACETAMINOPHEN 10; 325 MG/1; MG/1
1 TABLET ORAL AS NEEDED
Status: DISCONTINUED | OUTPATIENT
Start: 2022-02-02 | End: 2022-02-02 | Stop reason: HOSPADM

## 2022-02-02 RX ORDER — MEPERIDINE HYDROCHLORIDE 25 MG/ML
12.5 INJECTION INTRAMUSCULAR; INTRAVENOUS; SUBCUTANEOUS AS NEEDED
Status: DISCONTINUED | OUTPATIENT
Start: 2022-02-02 | End: 2022-02-02 | Stop reason: HOSPADM

## 2022-02-02 RX ADMIN — SODIUM CHLORIDE, SODIUM LACTATE, POTASSIUM CHLORIDE, CALCIUM CHLORIDE: 600; 310; 30; 20 INJECTION, SOLUTION INTRAVENOUS at 10:04:00

## 2022-02-02 RX ADMIN — DEXAMETHASONE SODIUM PHOSPHATE 2 MG: 4 MG/ML VIAL (ML) INJECTION at 08:45:00

## 2022-02-02 RX ADMIN — PHENYLEPHRINE HCL 100 MCG: 10 MG/ML VIAL (ML) INJECTION at 09:09:00

## 2022-02-02 RX ADMIN — BUPRENORPHINE HYDROCHLORIDE 150 MCG: 0.32 INJECTION INTRAMUSCULAR; INTRAVENOUS at 08:45:00

## 2022-02-02 RX ADMIN — PHENYLEPHRINE HCL 100 MCG: 10 MG/ML VIAL (ML) INJECTION at 09:22:00

## 2022-02-02 RX ADMIN — TRANEXAMIC ACID 1000 MG: 10 INJECTION, SOLUTION INTRAVENOUS at 08:47:00

## 2022-02-02 RX ADMIN — CEFAZOLIN SODIUM/WATER 2 G: 2 G/20 ML SYRINGE (ML) INTRAVENOUS at 08:41:00

## 2022-02-02 RX ADMIN — BUPIVACAINE HYDROCHLORIDE 1.8 ML: 7.5 INJECTION, SOLUTION INTRASPINAL at 08:42:00

## 2022-02-02 RX ADMIN — PHENYLEPHRINE HCL 100 MCG: 10 MG/ML VIAL (ML) INJECTION at 09:45:00

## 2022-02-02 RX ADMIN — MIDAZOLAM HYDROCHLORIDE 2 MG: 1 INJECTION INTRAMUSCULAR; INTRAVENOUS at 08:38:00

## 2022-02-02 NOTE — PROGRESS NOTES
Post op meds sent electronically to [facility] in [address] [place] [address] in [facility]

## 2022-02-02 NOTE — INTERVAL H&P NOTE
Pre-op Diagnosis: Primary osteoarthritis of right knee [M17.11]    The above referenced H&P was reviewed by David Haro MD on 2/2/2022, the patient was examined and no significant changes have occurred in the patient's condition since the H&P was performed. I discussed with the patient and/or legal representative the potential benefits, risks and side effects of this procedure; the likelihood of the patient achieving goals; and potential problems that might occur during recuperation. I discussed reasonable alternatives to the procedure, including risks, benefits and side effects related to the alternatives and risks related to not receiving this procedure. We will proceed with procedure as planned.

## 2022-02-02 NOTE — PROGRESS NOTES
NURSING ADMISSION NOTE      Patient admitted via Cart/bed from PACU post right total knee replacement done by Dr. Farhad English. Oriented to room. Received awake, alert and oriented x 4. Safety precautions initiated. Right knee with Aquacel dressing, looks clean and dry. Bed in low position. Post surgical orders discussed with patient, he verbalized understanding. Call light in reach. Reinforced instructions regarding \" call and don't fall\" safety protocol. [id number] for medical consult, Dr. Elton maya, waiting for call back.

## 2022-02-02 NOTE — PROGRESS NOTES
Reviewed indications, side effects of pain medication/narcotics and constipation prevention. Stressed importance of increased fluids/roughage in diet, continued use of stool softeners along with laxatives and suppositories as needed while taking narcotics even when at home. Pt verbalized understanding. Teachback done on ankle pumps and incentive spirometry use 10 times every hour w/a for each. Reviewed spandagrip, dressing changes, showering, elevation and cold therapy. Encouraged patient to watch discharge education video tomorrow. Reviewed discharge plan with patient. Solicited and answered any questions regarding care.

## 2022-02-02 NOTE — PLAN OF CARE
Problem: PAIN - ADULT  Goal: Verbalizes/displays adequate comfort level or patient's stated pain goal  Description: INTERVENTIONS:  - Encourage pt to monitor pain and request assistance  - Assess pain using appropriate pain scale  - Administer analgesics based on type and severity of pain and evaluate response  - Implement non-pharmacological measures as appropriate and evaluate response  - Consider cultural and social influences on pain and pain management  - Manage/alleviate anxiety  - Utilize distraction and/or relaxation techniques  - Monitor for opioid side effects  - Notify MD/LIP if interventions unsuccessful or patient reports new pain  - Anticipate increased pain with activity and pre-medicate as appropriate  Outcome: Progressing     Problem: SAFETY ADULT - FALL  Goal: Free from fall injury  Description: INTERVENTIONS:  - Assess pt frequently for physical needs  - Identify cognitive and physical deficits and behaviors that affect risk of falls.   - [place] fall precautions as indicated by assessment.  - Educate pt/family on patient safety including physical limitations  - Instruct pt to call for assistance with activity based on assessment  - Modify environment to reduce risk of injury  - Provide assistive devices as appropriate  - Consider OT/PT consult to assist with strengthening/mobility  - Encourage toileting schedule  Outcome: Progressing

## 2022-02-02 NOTE — ANESTHESIA PROCEDURE NOTES
Regional Block  Performed by: Nadege Navarro MD  Authorized by: Nadege Navarro MD       General Information and Staff    Start Time:  2/2/2022 8:45 AM  End Time:  2/2/2022 8:47 AM  Anesthesiologist:  Nadege Navarro MD  Performed by: Anesthesiologist  Patient Location:  OR    Block Placement: Pre Induction  Site Identification: real time ultrasound guided and image stored and retrievable    Block site/laterality marked before start: site marked  Reason for Block: at surgeon's request and post-op pain management    Preanesthetic Checklist: 2 patient identifers, IV checked, risks and benefits discussed, monitors and equipment checked, pre-op evaluation, timeout performed, anesthesia consent, sterile technique used, no prohibitive neurological deficits and no local skin infection at insertion site      Procedure Details    Patient Position:  Supine  Prep: ChloraPrep    Monitoring:  Cardiac monitor, continuous pulse ox and blood pressure cuff  Block Type: Adductor canal  Laterality:  Right  Injection Technique:  Single-shot    Needle    Needle Type:  Short-bevel and echogenic  Needle Gauge:  22 G  Needle Length:  110 mm  Needle Localization:  Ultrasound guidance  Reason for Ultrasound Use: appropriate spread of the medication was noted in real time and no ultrasound evidence of intravascular and/or intraneural injection            Assessment    Injection Assessment:  Good spread noted, negative resistance, negative aspiration for heme, incremental injection and low pressure  Heart Rate Change: No    - Patient tolerated block procedure well without evidence of immediate block related complications.      Medications      Additional Comments    Medication:  Ropivacaine 0.5% 20mL with 1:200,000 epi    Dexamethasone 2 mg preservative free and Buprenorphine 150 mcg

## 2022-02-02 NOTE — OPERATIVE REPORT
DATE OF PROCEDURE:  2/2/2022  PREOPERATIVE DIAGNOSIS: Right knee osteoarthritis. POSTOPERATIVE DIAGNOSIS: Right knee osteoarthritis. PROCEDURE PERFORMED: Cemented right total knee arthroplasty. SURGEON:  George Dunlap M.D. FIRST ASSISTANT: Aubrey Gonzales PA-C.   SECOND ASSISTANT:  Danial Sotelo  ANESTHESIA: Spinal plus femoral nerve block. INDICATIONS: The patient has severe knee osteoarthritis that has not responded to conservative treatment including antiinflammatories and injections. The patient's pain has limited activities of daily living including ambulation and exercise. DESCRIPTION OF PROCEDURE: The patient was brought to the operating room and under spinal anesthetic, the right lower extremity was sterilely prepped and draped. Preoperative antibiotics had been administered. A high thigh tourniquet was inflated to 300. It was medically necessary for the presence of the assistants listed for safe patient care. This included positioning of the leg, holding of retractors to protect the underlying neurovascular structures and soft tissues. It was required for the assistants to hold retractors to protect soft tissues while the primary surgeon made the bone cuts on the femur, the tibia, and the patella. The assistants also held the leg stable and positioned while the primary surgeon made the approach, and the bone cuts, and affixed the guides and later the components to the bones. An anterior incision was made, medial and lateral flaps were created. A medial parapatellar arthrotomy was created. The patella was everted, the knee was flexed. Severe arthritic changes with areas of eburnated bone were noted. A drill was placed in the distal femur and a 6-degree 10 mm cut was made. The [employer] rotating platform system was used. Sizing was performed and a size 7 cutting block was selected to make anterior, posterior, chamfer and box cuts for a cruciate-sacrificing knee.  Attention was turned to the tibia. An external alignment guide was utilized to take 10 mm off the less involved lateral side. Sizing was performed and a size 8 fit well. There were equal medial and lateral gaps when checked with a spacer. Equal flexion and extension gaps were obtained. The stem cut was made for the tibia and 10 mm was taken off the posterior patella. Sizing was performed and a size 41 patella was selected. Three drill holes were placed. Trial was performed with a 7 femur, 8 tibia, 10 mm insert and 41 mm patella. This gave good varus and valgus stability, good flexion and extension, good tracking of the patella. Distal femoral condyle drill holes were made using the trial template. Final components the same size as the trials were utilized. Trial components were removed. Bony surfaces were irrigated and dried. Final components were precoated with cement which had been mixed under vacuum conditions. The bony surfaces were precoated as well. Components were impacted into place and excess cement removed. The knee was held in extension while the cement hardened. The tourniquet was let down, bleeders were cauterized. Lavage was performed. The arthrotomy was closed with a barbed running suture. Subcutaneous tissue was closed after further irrigation. This was closed with inverted 2-0 Vicryl for the subcutaneous tissue and staples for the skin. An aquacell dressing plus gauze covering was applied. A lightly compressive dressing from toe to groin was applied. Estimated blood loss was 150 mL. There were no complications. There were no specimens. The patient was brought to the PACU in stable condition.

## 2022-02-02 NOTE — ANESTHESIA PROCEDURE NOTES
Spinal Block  Performed by: Renuka Merchant MD  Authorized by: Renuka Merchant MD       General Information and Staff    Start Time:  2/2/2022 8:38 AM  End Time:  2/2/2022 8:45 AM  Anesthesiologist:  Renuka Merchant MD  Performed by:   Anesthesiologist  Patient Location:  OR  Site identification: surface landmarks    Preanesthetic Checklist: patient identified, IV checked, risks and benefits discussed, monitors and equipment checked, pre-op evaluation, timeout performed, anesthesia consent and sterile technique used      Procedure Details    Patient Position:  Sitting  Prep: ChloraPrep    Monitoring:  Cardiac monitor, heart rate and continuous pulse ox  Approach:  Midline  Location:  L3-4  Injection Technique:  Single-shot    Needle    Needle Type:  Sprotte  Needle Gauge:  24 G  Needle Length:  3.5 in    Assessment    Sensory Level:  T8  Events: clear CSF, CSF aspirated, well tolerated and blood negative      Additional Comments     Bupivacaine 0.75% 1.8 ml

## 2022-02-03 VITALS
HEIGHT: 69 IN | DIASTOLIC BLOOD PRESSURE: 67 MMHG | HEART RATE: 74 BPM | OXYGEN SATURATION: 96 % | RESPIRATION RATE: 18 BRPM | BODY MASS INDEX: 27.43 KG/M2 | WEIGHT: 185.19 LBS | TEMPERATURE: 98 F | SYSTOLIC BLOOD PRESSURE: 125 MMHG

## 2022-02-03 LAB
HCT VFR BLD AUTO: 40.4 %
HGB BLD-MCNC: 13.6 G/DL

## 2022-02-03 PROCEDURE — 97165 OT EVAL LOW COMPLEX 30 MIN: CPT

## 2022-02-03 PROCEDURE — 97116 GAIT TRAINING THERAPY: CPT

## 2022-02-03 PROCEDURE — 85018 HEMOGLOBIN: CPT | Performed by: ORTHOPAEDIC SURGERY

## 2022-02-03 PROCEDURE — 97535 SELF CARE MNGMENT TRAINING: CPT

## 2022-02-03 PROCEDURE — 97110 THERAPEUTIC EXERCISES: CPT

## 2022-02-03 PROCEDURE — 85014 HEMATOCRIT: CPT | Performed by: ORTHOPAEDIC SURGERY

## 2022-02-03 RX ORDER — PSEUDOEPHEDRINE HCL 30 MG
100 TABLET ORAL 2 TIMES DAILY PRN
Qty: 30 CAPSULE | Refills: 0 | Status: SHIPPED | OUTPATIENT
Start: 2022-02-03

## 2022-02-03 NOTE — PROGRESS NOTES
Again, reviewed indications, side effects of pain medication/narcotics and constipation prevention. Stressed importance of increased fluids/roughage in diet, continued use of stool softeners along with laxatives and suppositories as needed while taking narcotics even when at home. Pt verbalized understanding. Teachback done on ankle pumps and incentive spirometry use 10 times every hour w/a for each. Reviewed sspandagrip, dressing changes, showering, elevation and cold therapy. Patient wants to watch discharge education video when wife Marquis Payne arrives. Reviewed discharge plan with patient. Solicited and answered any questions regarding care.

## 2022-02-03 NOTE — PLAN OF CARE
Patient A/O x4, VSS on RA, denies pain at this time. States he has pain when placing pressure on R leg.  and SCDs. IVF continued. Knee immobilizer in place. Plan for home w/HH when cleared. Safety measures in place. No complaints at this time.

## 2022-02-03 NOTE — PROGRESS NOTES
Discharge paperwork reviewed with patient and wife at bedside, reviewed all medications. Gave patient prescription. All questions answered, verbalized understanding.

## 2022-02-03 NOTE — PLAN OF CARE
Patient A&Ox4, VSS on room air. POD #1 R TKR, knee immobilizer in place for buckling. Improving feeling in right leg, mildly decreased this morning. Pain controlled with PO scheduled pain medication. Dressing is C/D/I. Up with therapy, tolerating well. Patient ok to DC this afternoon. Wife at bedside. Plan of care reviewed with patient and wife, all questions answered, verbalized understanding.

## 2022-03-01 PROBLEM — Z96.651 STATUS POST TOTAL RIGHT KNEE REPLACEMENT: Status: ACTIVE | Noted: 2022-03-01

## 2022-03-01 PROBLEM — Z96.651 STATUS POST REVISION OF TOTAL KNEE, RIGHT: Status: ACTIVE | Noted: 2022-03-01

## 2022-03-01 PROBLEM — M25.561 ACUTE PAIN OF RIGHT KNEE: Status: ACTIVE | Noted: 2022-03-01

## 2022-05-03 ENCOUNTER — APPOINTMENT (OUTPATIENT)
Dept: GASTROENTEROLOGY | Age: 72
End: 2022-05-03

## 2022-06-30 RX ORDER — ATORVASTATIN CALCIUM 10 MG/1
10 TABLET, FILM COATED ORAL NIGHTLY
Qty: 90 TABLET | Refills: 0 | Status: SHIPPED | OUTPATIENT
Start: 2022-06-30 | End: 2022-08-30

## 2022-07-26 ENCOUNTER — APPOINTMENT (OUTPATIENT)
Dept: FAMILY MEDICINE | Age: 72
End: 2022-07-26

## 2022-08-30 ENCOUNTER — APPOINTMENT (OUTPATIENT)
Dept: GASTROENTEROLOGY | Age: 72
End: 2022-08-30
Attending: FAMILY MEDICINE

## 2022-08-30 RX ORDER — ATORVASTATIN CALCIUM 10 MG/1
10 TABLET, FILM COATED ORAL NIGHTLY
Qty: 90 TABLET | Refills: 0 | Status: SHIPPED | OUTPATIENT
Start: 2022-08-30 | End: 2023-01-17

## 2022-09-01 RX ORDER — ATORVASTATIN CALCIUM 10 MG/1
10 TABLET, FILM COATED ORAL NIGHTLY
Qty: 90 TABLET | Refills: 0 | OUTPATIENT
Start: 2022-09-01

## 2022-12-21 RX ORDER — ATORVASTATIN CALCIUM 10 MG/1
10 TABLET, FILM COATED ORAL NIGHTLY
Qty: 90 TABLET | Refills: 0 | OUTPATIENT
Start: 2022-12-21

## 2023-01-17 RX ORDER — ATORVASTATIN CALCIUM 10 MG/1
10 TABLET, FILM COATED ORAL NIGHTLY
Qty: 90 TABLET | Refills: 0 | Status: SHIPPED | OUTPATIENT
Start: 2023-01-17

## 2023-04-25 RX ORDER — ATORVASTATIN CALCIUM 10 MG/1
10 TABLET, FILM COATED ORAL NIGHTLY
Qty: 90 TABLET | Refills: 0 | OUTPATIENT
Start: 2023-04-25

## 2023-05-22 RX ORDER — ATORVASTATIN CALCIUM 10 MG/1
10 TABLET, FILM COATED ORAL NIGHTLY
Qty: 90 TABLET | Refills: 0 | OUTPATIENT
Start: 2023-05-22

## (undated) DEVICE — SCD SLEEVE KNEE HI BLEND

## (undated) DEVICE — NEEDLE SPINAL 18X3-1/2 PINK.

## (undated) DEVICE — LIGHT HANDLE

## (undated) DEVICE — HOOD: Brand: FLYTE

## (undated) DEVICE — 450 ML BOTTLE OF 0.05% CHLORHEXIDINE GLUCONATE IN 99.95% STERILE WATER FOR IRRIGATION, USP AND APPLICATOR.: Brand: IRRISEPT ANTIMICROBIAL WOUND LAVAGE

## (undated) DEVICE — Device: Brand: STABLECUT®

## (undated) DEVICE — HOOD, PEEL-AWAY: Brand: FLYTE

## (undated) DEVICE — STERILE POLYISOPRENE POWDER-FREE SURGICAL GLOVES WITH EMOLLIENT COATING: Brand: PROTEXIS

## (undated) DEVICE — STERILE POLYISOPRENE POWDER-FREE SURGICAL GLOVES: Brand: PROTEXIS

## (undated) DEVICE — BOWL CEMENT MIX QUICK-VAC

## (undated) DEVICE — UNIVERSAL STERIBUMP® STERILE (5/CASE): Brand: UNIVERSAL STERIBUMP®

## (undated) DEVICE — SUTURE VICRYL 1 OS-6

## (undated) DEVICE — STERILE HOOK LOCK LATEX FREE ELASTIC BANDAGE 4INX5YD: Brand: HOOK LOCK™

## (undated) DEVICE — DRAPE,U/SHT,SPLIT,FILM,60X84,STERILE: Brand: MEDLINE

## (undated) DEVICE — SUTURE VICRYL 2-0 CP-1

## (undated) DEVICE — SOL  .9 3000ML

## (undated) DEVICE — BIPOLAR SEALER 23-112-1 AQM 6.0: Brand: AQUAMANTYS™

## (undated) DEVICE — CHLORAPREP 26ML APPLICATOR

## (undated) DEVICE — WRAP COOLING KNEE W/ICE PILLOW

## (undated) DEVICE — SIGMA LCS HIGH PERFORMANCE STERILE THREADED HEADED PINS: Brand: SIGMA LCS HIGH PERFORMANCE

## (undated) DEVICE — CONVERTORS STOCKINETTE: Brand: CONVERTORS

## (undated) DEVICE — SOL  .9 1000ML BTL

## (undated) DEVICE — SYRINGE 30ML LL TIP

## (undated) DEVICE — SIGMA LCS HIGH PERFORMANCE INSTRUMENTS STERILE THREADED PINS: Brand: SIGMA LCS HIGH PERFORMANCE

## (undated) DEVICE — GOWN SURG AERO CHROME XXL

## (undated) DEVICE — DISPOSABLE TOURNIQUET CUFF SINGLE BLADDER, DUAL PORT AND QUICK CONNECT CONNECTOR: Brand: COLOR CUFF

## (undated) DEVICE — TOTAL KNEE CDS: Brand: MEDLINE INDUSTRIES, INC.

## (undated) DEVICE — 2T11 #2 PDO 36 X 36: Brand: 2T11 #2 PDO 36 X 36

## (undated) NOTE — LETTER
OUTSIDE TESTING RESULT REQUEST     IMPORTANT: FOR YOUR IMMEDIATE ATTENTION  Please FAX all test results listed below to: [phone]     Testing already done on or about: 22    * * * * If testing is NOT complete, arrange with patient A.S.A.P. * * * *      Patient Name: Chichi Rivera  Surgery Date: 2022  CSN: [id number]  Medical Record: [id number]   : 7/3/1950 - A: 73 y      Sex: male  Surgeon(s):  Viola Torres MD  Procedure: RIGHT TOTAL KNEE ARTHROPLASTY   Anesthesia Type: Choice     Surgeon: Viola Torres MD     The following Testing and Time Line are REQUIRED PER ANESTHESIA     EKG READ AND SIGNED WITHIN   90 days  CBC [with Differential & Platelets] within  90 days  CMP (requires 4 hour fast) within  90 days  PT/INR within  30 days  PTT within  30 days  UA with Reflex to Culture within  30 days  MSSA/MRSA Nasal screening within 30 days      Thank You,   Sent by: Yajaira Corcoran  5/13/15